# Patient Record
Sex: FEMALE | Race: ASIAN | NOT HISPANIC OR LATINO | Employment: FULL TIME | URBAN - METROPOLITAN AREA
[De-identification: names, ages, dates, MRNs, and addresses within clinical notes are randomized per-mention and may not be internally consistent; named-entity substitution may affect disease eponyms.]

---

## 2017-12-21 ENCOUNTER — GENERIC CONVERSION - ENCOUNTER (OUTPATIENT)
Dept: OTHER | Facility: OTHER | Age: 32
End: 2017-12-21

## 2017-12-26 ENCOUNTER — ALLSCRIPTS OFFICE VISIT (OUTPATIENT)
Dept: OTHER | Facility: OTHER | Age: 32
End: 2017-12-26

## 2017-12-26 ENCOUNTER — GENERIC CONVERSION - ENCOUNTER (OUTPATIENT)
Dept: OTHER | Facility: OTHER | Age: 32
End: 2017-12-26

## 2017-12-26 DIAGNOSIS — Z34.81 ENCOUNTER FOR SUPERVISION OF OTHER NORMAL PREGNANCY, FIRST TRIMESTER: ICD-10-CM

## 2017-12-30 ENCOUNTER — TRANSCRIBE ORDERS (OUTPATIENT)
Dept: LAB | Facility: HOSPITAL | Age: 32
End: 2017-12-30

## 2017-12-30 ENCOUNTER — APPOINTMENT (OUTPATIENT)
Dept: LAB | Facility: HOSPITAL | Age: 32
End: 2017-12-30
Attending: OBSTETRICS & GYNECOLOGY
Payer: COMMERCIAL

## 2017-12-30 DIAGNOSIS — Z34.81 ENCOUNTER FOR SUPERVISION OF OTHER NORMAL PREGNANCY, FIRST TRIMESTER: ICD-10-CM

## 2017-12-30 LAB
ABO GROUP BLD: NORMAL
BASOPHILS # BLD AUTO: 0.01 THOUSANDS/ΜL (ref 0–0.1)
BASOPHILS NFR BLD AUTO: 0 % (ref 0–1)
BILIRUB UR QL STRIP: NEGATIVE
BLD GP AB SCN SERPL QL: NEGATIVE
CLARITY UR: CLEAR
COLOR UR: YELLOW
EOSINOPHIL # BLD AUTO: 0.05 THOUSAND/ΜL (ref 0–0.61)
EOSINOPHIL NFR BLD AUTO: 1 % (ref 0–6)
ERYTHROCYTE [DISTWIDTH] IN BLOOD BY AUTOMATED COUNT: 13 % (ref 11.6–15.1)
GLUCOSE UR STRIP-MCNC: NEGATIVE MG/DL
HBV SURFACE AG SER QL: NORMAL
HCT VFR BLD AUTO: 36.6 % (ref 34.8–46.1)
HGB BLD-MCNC: 12.4 G/DL (ref 11.5–15.4)
HGB UR QL STRIP.AUTO: NEGATIVE
KETONES UR STRIP-MCNC: NEGATIVE MG/DL
LEUKOCYTE ESTERASE UR QL STRIP: NEGATIVE
LYMPHOCYTES # BLD AUTO: 1.69 THOUSANDS/ΜL (ref 0.6–4.47)
LYMPHOCYTES NFR BLD AUTO: 23 % (ref 14–44)
MCH RBC QN AUTO: 29.5 PG (ref 26.8–34.3)
MCHC RBC AUTO-ENTMCNC: 33.9 G/DL (ref 31.4–37.4)
MCV RBC AUTO: 87 FL (ref 82–98)
MONOCYTES # BLD AUTO: 0.51 THOUSAND/ΜL (ref 0.17–1.22)
MONOCYTES NFR BLD AUTO: 7 % (ref 4–12)
NEUTROPHILS # BLD AUTO: 5.01 THOUSANDS/ΜL (ref 1.85–7.62)
NEUTS SEG NFR BLD AUTO: 69 % (ref 43–75)
NITRITE UR QL STRIP: NEGATIVE
NRBC BLD AUTO-RTO: 0 /100 WBCS
PH UR STRIP.AUTO: 7.5 [PH] (ref 4.5–8)
PLATELET # BLD AUTO: 253 THOUSANDS/UL (ref 149–390)
PMV BLD AUTO: 8.9 FL (ref 8.9–12.7)
PROT UR STRIP-MCNC: NEGATIVE MG/DL
RBC # BLD AUTO: 4.2 MILLION/UL (ref 3.81–5.12)
RH BLD: POSITIVE
SP GR UR STRIP.AUTO: 1.01 (ref 1–1.03)
SPECIMEN EXPIRATION DATE: NORMAL
UROBILINOGEN UR QL STRIP.AUTO: 0.2 E.U./DL
WBC # BLD AUTO: 7.29 THOUSAND/UL (ref 4.31–10.16)

## 2017-12-30 PROCEDURE — 86900 BLOOD TYPING SEROLOGIC ABO: CPT

## 2017-12-30 PROCEDURE — 87340 HEPATITIS B SURFACE AG IA: CPT

## 2017-12-30 PROCEDURE — 36415 COLL VENOUS BLD VENIPUNCTURE: CPT

## 2017-12-30 PROCEDURE — 86592 SYPHILIS TEST NON-TREP QUAL: CPT

## 2017-12-30 PROCEDURE — 85025 COMPLETE CBC W/AUTO DIFF WBC: CPT

## 2017-12-30 PROCEDURE — 87086 URINE CULTURE/COLONY COUNT: CPT

## 2017-12-30 PROCEDURE — 81003 URINALYSIS AUTO W/O SCOPE: CPT

## 2017-12-30 PROCEDURE — 87389 HIV-1 AG W/HIV-1&-2 AB AG IA: CPT

## 2017-12-30 PROCEDURE — 86850 RBC ANTIBODY SCREEN: CPT

## 2017-12-30 PROCEDURE — 86901 BLOOD TYPING SEROLOGIC RH(D): CPT

## 2017-12-31 LAB — BACTERIA UR CULT: NORMAL

## 2018-01-01 LAB
HIV 1+2 AB+HIV1 P24 AG SERPL QL IA: NORMAL
RPR SER QL: NORMAL

## 2018-01-10 ENCOUNTER — GENERIC CONVERSION - ENCOUNTER (OUTPATIENT)
Dept: OTHER | Facility: OTHER | Age: 33
End: 2018-01-10

## 2018-01-10 ENCOUNTER — ALLSCRIPTS OFFICE VISIT (OUTPATIENT)
Dept: PERINATAL CARE | Facility: CLINIC | Age: 33
End: 2018-01-10
Payer: COMMERCIAL

## 2018-01-10 PROCEDURE — 76813 OB US NUCHAL MEAS 1 GEST: CPT | Performed by: OBSTETRICS & GYNECOLOGY

## 2018-01-10 PROCEDURE — 76801 OB US < 14 WKS SINGLE FETUS: CPT | Performed by: OBSTETRICS & GYNECOLOGY

## 2018-01-15 ENCOUNTER — GENERIC CONVERSION - ENCOUNTER (OUTPATIENT)
Dept: OTHER | Facility: OTHER | Age: 33
End: 2018-01-15

## 2018-01-15 ENCOUNTER — LAB REQUISITION (OUTPATIENT)
Dept: LAB | Facility: HOSPITAL | Age: 33
End: 2018-01-15
Payer: COMMERCIAL

## 2018-01-15 ENCOUNTER — ALLSCRIPTS OFFICE VISIT (OUTPATIENT)
Dept: OTHER | Facility: OTHER | Age: 33
End: 2018-01-15

## 2018-01-15 DIAGNOSIS — Z34.81 ENCOUNTER FOR SUPERVISION OF OTHER NORMAL PREGNANCY, FIRST TRIMESTER: ICD-10-CM

## 2018-01-15 PROCEDURE — 87591 N.GONORRHOEAE DNA AMP PROB: CPT | Performed by: OBSTETRICS & GYNECOLOGY

## 2018-01-15 PROCEDURE — G0145 SCR C/V CYTO,THINLAYER,RESCR: HCPCS | Performed by: OBSTETRICS & GYNECOLOGY

## 2018-01-15 PROCEDURE — 87491 CHLMYD TRACH DNA AMP PROBE: CPT | Performed by: OBSTETRICS & GYNECOLOGY

## 2018-01-15 PROCEDURE — 87624 HPV HI-RISK TYP POOLED RSLT: CPT | Performed by: OBSTETRICS & GYNECOLOGY

## 2018-01-18 LAB
CHLAMYDIA DNA CVX QL NAA+PROBE: NORMAL
N GONORRHOEA DNA GENITAL QL NAA+PROBE: NORMAL

## 2018-01-22 LAB — HPV RRNA GENITAL QL NAA+PROBE: NORMAL

## 2018-01-23 LAB
LAB AP GYN PRIMARY INTERPRETATION: NORMAL
LAB AP LMP: NORMAL
Lab: NORMAL

## 2018-01-23 NOTE — PROGRESS NOTES
NOLAN 10 2018         RE: Monika Fuentes                               To: [facility]  MR#: [id number]                                    [facility]  : [address] [facility],[address]: [id number]                             Aura Leach U  6    (Exam #: [id number])                           Fax: [phone]      The LMP of this 34year old,  G3, P2-0-0-2 patient was OCT 12 2017, giving   her an ROCIO of 2018 and a current gestational age of 16 weeks 2 days   by dates  A sonographic examination was performed on NOLAN 10 2018 using   real time equipment  The ultrasound examination was performed using   abdominal technique  The patient has a BMI of 24 9  Her blood pressure   today was 117/59  Earliest US on record:    17  8w5d  18    ROCIO Multiple   longitudinal and transverse sections revealed a swift intrauterine   pregnancy with the fetus in variable presentation  The placenta is   anterior in implantation, grade 0 in appearance  Cardiac motion was observed at 163 bpm       INDICATIONS      first trimester genetic screening      Exam Types      Level I      RESULTS      Fetus # 1 of 1   Variable presentation      MEASUREMENTS (* Included In Average GA)      CRL              5 5 cm        11 weeks 6 days*   Nuchal Trans    1 10 mm      THE AVERAGE GESTATIONAL AGE is 11 weeks 6 days +/- 7 days  ANATOMY COMMENTS      Anatomic detail is limited at this gestational age  The fetal cranium   appeared normal in shape and the nuchal translucency was normal in size   (1 1mm)  The nasal bone appears to be present  The intracranial anatomy   was unremarkable  Anatomy of the fetal thorax appeared within normal   limits  The cardiac rhythm was regular and documented with M-mode  Within   the abdomen, the stomach & bladder were visualized and the abdominal wall   appeared intact  A three vessel cord appears to be present  Active   movement of the fetal body & extremities was seen  There is no suspicion   of a subchorionic bleed  The placental cord insertion appeared normal      There is no suspicion of a uterine myoma  Free fluid is not seen in the   posterior cul-de-sac  ADNEXA      The left ovary appeared normal and measured 2 0 x 2 1 x 1 4 cm with a   volume of 3 2 cc  The right ovary appeared normal and measured 1 9 x 1 9 x   1 0 cm with a volume of 1 9 cc       AMNIOTIC FLUID         Largest Vertical Pocket = 3 9 cm   Amniotic Fluid: Normal      IMPRESSION      Ryan IUP   11 weeks and 6 days by this ultrasound  (ROCIO=JUL 26 2018)   Variable presentation   Regular fetal heart rate of 163 bpm   Anterior placenta      GENERAL COMMENT      Ms Monty Mabry is here for nuchal translucency  There is a single live intrauterine pregnancy with size equivalent to   dates  No gross anomalies were identified on limited views  Amniotic fluid is within normal limits  Nuchal translucency measures 1 1mm which is within normal limits for this   crown-rump length  Evaluation and Management:   The patient was counseled regarding the above findings  A total of 10   minutes were spent in this encounter with >50% of the time spent in   face-to-face counseling and in coordination of care  The limitations of ultrasound and aneuploidy screening were explained to   her  Genetic screening and diagnostic testing options were discussed with   her  Blood work was drawn today for Sequential Screen  She should return in 7-8 weeks for anatomy survey and cervical length   screening  At the conclusion of today's encounter, all questions were answered to her   satisfaction  Thank you very much for this kind referral and please do   not hesitate to contact me with any further questions or concerns  RALPH Apodaca M D     Maternal Fetal Medicine   Electronically signed 01/10/18 09:51

## 2018-01-24 ENCOUNTER — TELEPHONE (OUTPATIENT)
Dept: OBGYN CLINIC | Facility: CLINIC | Age: 33
End: 2018-01-24

## 2018-01-24 VITALS
WEIGHT: 120.13 LBS | DIASTOLIC BLOOD PRESSURE: 66 MMHG | HEIGHT: 58 IN | BODY MASS INDEX: 25.22 KG/M2 | SYSTOLIC BLOOD PRESSURE: 122 MMHG

## 2018-01-24 VITALS — SYSTOLIC BLOOD PRESSURE: 110 MMHG | DIASTOLIC BLOOD PRESSURE: 58 MMHG | BODY MASS INDEX: 24.24 KG/M2 | WEIGHT: 118 LBS

## 2018-01-24 VITALS
HEART RATE: 88 BPM | WEIGHT: 119 LBS | HEIGHT: 58 IN | BODY MASS INDEX: 24.98 KG/M2 | SYSTOLIC BLOOD PRESSURE: 117 MMHG | DIASTOLIC BLOOD PRESSURE: 59 MMHG

## 2018-01-24 NOTE — TELEPHONE ENCOUNTER
Returned pts' phone call _ L/M on voicemail for pt [facility]

## 2018-01-24 NOTE — TELEPHONE ENCOUNTER
Pt called - 14 wks - last night she had a cramp/pain - went away & felt better this morning as well as feeling well this evening - not spotting - but has a slight discharge - please advise

## 2018-01-24 NOTE — TELEPHONE ENCOUNTER
Patient is 14 weeks gestation  States menstrual like cramping last night  Continues to have discharge which she states she mentioned at last office visit  No vaginal bleeding  Denies any irritation or itching  No history of pre term labor or risk factors  Advised hydration  May take tylenol  Reassured cramping can be expected  Report any worsening symptoms  Router to provider to further advise

## 2018-02-12 ENCOUNTER — TRANSCRIBE ORDERS (OUTPATIENT)
Dept: LAB | Facility: HOSPITAL | Age: 33
End: 2018-02-12

## 2018-02-12 ENCOUNTER — APPOINTMENT (OUTPATIENT)
Dept: LAB | Facility: HOSPITAL | Age: 33
End: 2018-02-12
Attending: OBSTETRICS & GYNECOLOGY
Payer: COMMERCIAL

## 2018-02-12 DIAGNOSIS — Z34.81 PRENATAL CARE, SUBSEQUENT PREGNANCY, FIRST TRIMESTER: ICD-10-CM

## 2018-02-12 DIAGNOSIS — Z34.81 PRENATAL CARE, SUBSEQUENT PREGNANCY, FIRST TRIMESTER: Primary | ICD-10-CM

## 2018-02-12 DIAGNOSIS — Z34.81 ENCOUNTER FOR SUPERVISION OF OTHER NORMAL PREGNANCY, FIRST TRIMESTER: ICD-10-CM

## 2018-02-12 LAB — RUBV IGG SERPL IA-ACNC: 44.9 IU/ML

## 2018-02-12 PROCEDURE — 86762 RUBELLA ANTIBODY: CPT

## 2018-02-12 PROCEDURE — 36415 COLL VENOUS BLD VENIPUNCTURE: CPT

## 2018-02-13 LAB — SCAN RESULT: NORMAL

## 2018-02-15 ENCOUNTER — ROUTINE PRENATAL (OUTPATIENT)
Dept: OBGYN CLINIC | Facility: CLINIC | Age: 33
End: 2018-02-15

## 2018-02-15 VITALS — WEIGHT: 123 LBS | BODY MASS INDEX: 25.71 KG/M2 | DIASTOLIC BLOOD PRESSURE: 64 MMHG | SYSTOLIC BLOOD PRESSURE: 102 MMHG

## 2018-02-15 DIAGNOSIS — Z34.82 MULTIGRAVIDA IN SECOND TRIMESTER: Primary | ICD-10-CM

## 2018-02-15 PROBLEM — L30.9 ECZEMA: Status: ACTIVE | Noted: 2018-02-15

## 2018-02-15 PROCEDURE — PNV: Performed by: PHYSICIAN ASSISTANT

## 2018-02-15 NOTE — PROGRESS NOTES
Problem List Items Addressed This Visit     Multigravida in second trimester - Primary     Feels well  No FM yet  Received flu shot  First OB labs normal, A+ blood type  Pap, HPV, cultures neg  Did Sequential screen  Has level II scheduled  Plans to breastfeed - breast pump slip given  Has two boys at home, she is hoping for a girl, he is hoping for another boy

## 2018-02-15 NOTE — ASSESSMENT & PLAN NOTE
Feels well  No FM yet  Received flu shot  First OB labs normal, A+ blood type  Pap, HPV, cultures neg  Did Sequential screen  Has level II scheduled  Plans to breastfeed - breast pump slip given  Has two boys at home, she is hoping for a girl, he is hoping for another boy

## 2018-03-01 ENCOUNTER — TELEPHONE (OUTPATIENT)
Dept: PERINATAL CARE | Facility: CLINIC | Age: 33
End: 2018-03-01

## 2018-03-01 ENCOUNTER — OFFICE VISIT (OUTPATIENT)
Dept: URGENT CARE | Facility: MEDICAL CENTER | Age: 33
End: 2018-03-01
Payer: COMMERCIAL

## 2018-03-01 VITALS
OXYGEN SATURATION: 100 % | WEIGHT: 122 LBS | HEIGHT: 58 IN | SYSTOLIC BLOOD PRESSURE: 112 MMHG | HEART RATE: 78 BPM | RESPIRATION RATE: 14 BRPM | DIASTOLIC BLOOD PRESSURE: 70 MMHG | BODY MASS INDEX: 25.61 KG/M2 | TEMPERATURE: 98.3 F

## 2018-03-01 DIAGNOSIS — H10.32 ACUTE BACTERIAL CONJUNCTIVITIS OF LEFT EYE: Primary | ICD-10-CM

## 2018-03-01 PROCEDURE — 99202 OFFICE O/P NEW SF 15 MIN: CPT | Performed by: FAMILY MEDICINE

## 2018-03-01 RX ORDER — TOBRAMYCIN 3 MG/ML
1 SOLUTION/ DROPS OPHTHALMIC
Qty: 3 ML | Refills: 0 | Status: SHIPPED | OUTPATIENT
Start: 2018-03-01 | End: 2018-03-11 | Stop reason: SDUPTHER

## 2018-03-01 NOTE — PATIENT INSTRUCTIONS
Symptoms are likely consistent with left-sided bacterial conjunctivitis and advised to use given  Antibiotic eyedrops as per label instructions  Keep a close eye on the symptoms and if symptoms are not improving or worse especially worsening eye discharge or pain, seek immediate medical attention

## 2018-03-01 NOTE — PROGRESS NOTES
Assessment/Plan:    No problem-specific Assessment & Plan notes found for this encounter  Diagnoses and all orders for this visit:    Acute bacterial conjunctivitis of left eye  -     tobramycin (TOBREX) 0 3 % SOLN; Administer 1 drop into the left eye every 4 (four) hours while awake          Subjective:      Patient ID: Placido Rossi is a 34 y o  female  33 y/o Marceline female presents with c/o L eye redness and drainage x 1 day  Pt wears contacts but didn't have contacts in when sx started  Pt c/o watery discharge from eye and iritation denies crusting shut or blurry vision or eye pain  Pt's son was treated for the same thing, was diagnosed 2 weeks ago and given ciprofloxacin drops  Pt denies history of seasonal allergies  Conjunctivitis    Associated symptoms include congestion and rhinorrhea  Pertinent negatives include no fever  The following portions of the patient's history were reviewed and updated as appropriate:   She  has a past medical history of 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; Eczema; and Latent tuberculosis  She   Patient Active Problem List    Diagnosis Date Noted    Eczema 02/15/2018    Multigravida in second trimester 02/15/2018     Current Outpatient Prescriptions   Medication Sig Dispense Refill    Prenat w/o S-DY-Csjfdqo-FA-DHA (PRENATE DHA) 28-0 6-0 4-300 MG CAPS Take by mouth      tobramycin (TOBREX) 0 3 % SOLN Administer 1 drop into the left eye every 4 (four) hours while awake 3 mL 0     No current facility-administered medications for this visit  Current Outpatient Prescriptions on File Prior to Visit   Medication Sig    Prenat w/o C-TY-Ettgcuv-FA-DHA (PRENATE DHA) 28-0 6-0 4-300 MG CAPS Take by mouth     No current facility-administered medications on file prior to visit  She has No Known Allergies       Review of Systems   Constitutional: Negative for fever  HENT: Positive for congestion and rhinorrhea  All other systems reviewed and are negative  Objective:      /70   Pulse 78   Temp 98 3 °F (36 8 °C)   Resp 14   Ht 4' 10" (1 473 m)   Wt 55 3 kg (122 lb)   LMP 10/16/2017   SpO2 100%   BMI 25 50 kg/m²          Physical Exam   Constitutional: She is oriented to person, place, and time  She appears well-developed and well-nourished  HENT:   Head: Normocephalic and atraumatic  Right Ear: Tympanic membrane and external ear normal  Tympanic membrane is not erythematous and not bulging  Left Ear: Tympanic membrane and external ear normal  Tympanic membrane is not erythematous and not bulging  Nose: Right sinus exhibits no maxillary sinus tenderness and no frontal sinus tenderness  Left sinus exhibits no maxillary sinus tenderness and no frontal sinus tenderness  Mouth/Throat: Oropharynx is clear and moist and mucous membranes are normal  No oropharyngeal exudate  Eyes: Pupils are equal, round, and reactive to light  Right eye exhibits no discharge and no hordeolum  No foreign body present in the right eye  Left eye exhibits discharge  Left eye exhibits no hordeolum  No foreign body present in the left eye  Right conjunctiva is not injected  Right conjunctiva has no hemorrhage  Left conjunctiva is injected  Left conjunctiva has no hemorrhage  No scleral icterus  Right eye exhibits normal extraocular motion and no nystagmus  Left eye exhibits normal extraocular motion and no nystagmus  Cardiovascular: Normal rate, regular rhythm, S1 normal and S2 normal   Exam reveals no gallop, no distant heart sounds and no friction rub  No murmur heard  Pulmonary/Chest: Effort normal and breath sounds normal  No respiratory distress  She has no wheezes  She has no rales  Abdominal: Soft  She exhibits no distension and no mass  There is no tenderness  There is no rebound, no guarding and no CVA tenderness  Neurological: She is alert and oriented to person, place, and time  Skin: Skin is warm and dry  No erythema     Psychiatric: She has a normal mood and affect  Patient was seen and examined by me independently and along with PA student  I agree with history, review of system and examination except for following modifications  26-year-old [place] female with complains of left eye discharge and some redness for 1 day without any pain and without any vision changes  She noticed watery discharge along with some crusting  Her son was diagnosed with same thing but 2 weeks ago  Vital signs stable and no acute pain or distress  On examination of the left eye there is moderate erythema of palpebral conjunctiva without any significant eyelid swelling of upper or lower eyelid  She mild injection of scleral noticed  Normal pupillary reflex bilaterally  No tenderness to palpation of eyeball    No lymphadenitis noticed in anterior or posterior chains

## 2018-03-07 NOTE — PROGRESS NOTES
Education  Baby & Me Education 1st Trimester:   First Trimester Education provided:   benefits of breastfeeding, importance of exclusive breastfeeding, early initiation of breastfeeding, exclusive breastfeeding for the first 6 months, Pregnancy Essentials Reference Guide given and Other education given: Sarah Troy The patient is planning on breastfeeding        Signatures   Electronically signed by : Vickie Rush, ; Dec 26 2017  2:44PM EST                       (Author)

## 2018-03-08 ENCOUNTER — OFFICE VISIT (OUTPATIENT)
Dept: FAMILY MEDICINE CLINIC | Facility: CLINIC | Age: 33
End: 2018-03-08
Payer: COMMERCIAL

## 2018-03-08 VITALS
SYSTOLIC BLOOD PRESSURE: 120 MMHG | DIASTOLIC BLOOD PRESSURE: 80 MMHG | BODY MASS INDEX: 26.36 KG/M2 | HEIGHT: 58 IN | TEMPERATURE: 99.3 F | HEART RATE: 80 BPM | RESPIRATION RATE: 16 BRPM | WEIGHT: 125.6 LBS

## 2018-03-08 DIAGNOSIS — B34.9 ACUTE VIRAL SYNDROME: Primary | ICD-10-CM

## 2018-03-08 DIAGNOSIS — Z34.82 MULTIGRAVIDA IN SECOND TRIMESTER: ICD-10-CM

## 2018-03-08 PROCEDURE — 99213 OFFICE O/P EST LOW 20 MIN: CPT | Performed by: FAMILY MEDICINE

## 2018-03-08 NOTE — PROGRESS NOTES
Assessment/Plan:   1  Viral syndrome: Supportive care  Return parameters d/w pt   2  Pregnancy, incidental to diagnosis: PTL precautions  F/U with OB provider as scheduled  Subjective:     Patient ID: Violet Albright is a 34 y o  female  33 yo female,  with IUP @ 28 weeks, who presents with one day history of nausea, and vomiting (non-bloody, non-bilious)  + ill contacts  Drinking OK, decreased appetite  No rash  Had URI earlier this week  No diarrhea  No new foods or medications  No rash  + GFM, no contractions, no bleeding, no LOF  Current Outpatient Prescriptions:     Prenat w/o V-AB-Htbnaqb-FA-DHA (PRENATE DHA) 28-0 6-0 4-300 MG CAPS, Take by mouth, Disp: , Rfl:     tobramycin (TOBREX) 0 3 % SOLN, Administer 1 drop into the left eye every 4 (four) hours while awake, Disp: 3 mL, Rfl: 0      Review of Systems   Constitutional: Positive for fatigue  Negative for fever  HENT: Positive for congestion, hearing loss, postnasal drip and rhinorrhea  Negative for ear discharge, ear pain, sinus pain and sinus pressure  Eyes: Negative for discharge  Respiratory: Positive for cough  Negative for chest tightness, shortness of breath and wheezing  Cardiovascular: Negative for chest pain, palpitations and leg swelling  Gastrointestinal: Positive for nausea and vomiting  Negative for constipation and diarrhea  Skin: Negative for rash  Objective:    Vitals:    18 0954   BP: 120/80   Pulse: 80   Resp: 16   Temp: 99 3 °F (37 4 °C)          Physical Exam   Constitutional: She appears well-developed and well-nourished  HENT:   Head: Normocephalic  Right Ear: External ear normal    Left Ear: External ear normal    + coryza  + mild erythema of oropharynx, MMM   Neck: Normal range of motion  Neck supple  Cardiovascular: Normal rate, regular rhythm, normal heart sounds and intact distal pulses  Pulmonary/Chest: Effort normal and breath sounds normal    Abdominal: Soft  Bowel sounds are normal    Gravid, FHT's 155   Skin: No rash noted  Cap refill <3 seconds     Nursing note and vitals reviewed

## 2018-03-08 NOTE — LETTER
March 8, 2018     Patient: Zuleika Fernandez   YOB: 1985   Date of Visit: 3/8/2018       To Whom it May Concern:    Rommel Duran is under my professional care  She was seen in my office on 3/8/2018  She may return to work on 3/11/2018  If you have any questions or concerns, please don't hesitate to call           Sincerely,          Nitin Almanza MD        CC: No Recipients

## 2018-03-11 ENCOUNTER — OFFICE VISIT (OUTPATIENT)
Dept: URGENT CARE | Age: 33
End: 2018-03-11
Payer: COMMERCIAL

## 2018-03-11 VITALS
DIASTOLIC BLOOD PRESSURE: 66 MMHG | TEMPERATURE: 99.2 F | HEIGHT: 58 IN | HEART RATE: 88 BPM | RESPIRATION RATE: 16 BRPM | SYSTOLIC BLOOD PRESSURE: 112 MMHG | WEIGHT: 125 LBS | OXYGEN SATURATION: 98 % | BODY MASS INDEX: 26.24 KG/M2

## 2018-03-11 DIAGNOSIS — J01.90 ACUTE SINUSITIS, RECURRENCE NOT SPECIFIED, UNSPECIFIED LOCATION: Primary | ICD-10-CM

## 2018-03-11 DIAGNOSIS — H10.32 ACUTE BACTERIAL CONJUNCTIVITIS OF LEFT EYE: ICD-10-CM

## 2018-03-11 PROCEDURE — 99213 OFFICE O/P EST LOW 20 MIN: CPT | Performed by: FAMILY MEDICINE

## 2018-03-11 RX ORDER — TOBRAMYCIN 3 MG/ML
1 SOLUTION/ DROPS OPHTHALMIC
Qty: 3 ML | Refills: 0 | Status: SHIPPED | OUTPATIENT
Start: 2018-03-11 | End: 2018-04-11 | Stop reason: ALTCHOICE

## 2018-03-11 RX ORDER — AMOXICILLIN 500 MG/1
500 CAPSULE ORAL EVERY 8 HOURS SCHEDULED
Qty: 30 CAPSULE | Refills: 0 | Status: SHIPPED | OUTPATIENT
Start: 2018-03-11 | End: 2018-03-21

## 2018-03-11 NOTE — PATIENT INSTRUCTIONS
Rest and drink adequate fluids  Start antibiotic  Call OB tomorrow to discuss use of probiotics  Start eye drop, avoid touching eye  Avoid rubbing eye and use clean cloth each time to clean eye  Nasal saline flushes  Continue to monitor fetal movements  GO to ER with decreased fetal movements or contractions  Follow up with PCP if no improvement

## 2018-03-11 NOTE — PROGRESS NOTES
[address]        NAME: Zuleika Fernandez is a 34 y o  female  : 1985    MRN: [id number]  DATE: 2018  TIME: 5:37 PM    Assessment and Plan   Acute sinusitis, recurrence not specified, unspecified location [J01 90]  1  Acute sinusitis, recurrence not specified, unspecified location  amoxicillin (AMOXIL) 500 mg capsule   2  Acute bacterial conjunctivitis of left eye  tobramycin (TOBREX) 0 3 % SOLN         Patient Instructions     Patient Instructions   Rest and drink adequate fluids  Start antibiotic  Call OB tomorrow to discuss use of probiotics  Start eye drop, avoid touching eye  Avoid rubbing eye and use clean cloth each time to clean eye  Nasal saline flushes  Continue to monitor fetal movements  GO to ER with decreased fetal movements or contractions  Follow up with PCP if no improvement  Chief Complaint     Chief Complaint   Patient presents with    Conjunctivitis         History of Present Illness   Zuleika Fernandez presents to the clinic c/o    This is a 34year old female here today with complaints of red left eye  She states on  she was treated for conjunctivitis of the left eye  The following day it moved to right eye  She continue eye drops and symptoms resolved  On  she had gastroenteritis with nausea and vomiting  She was seen by her PCP at the time  Symptoms resolved  She has slight upset stomach at this time  She continues to have sinus pressure and nasal congestion  She has had congestion since Monday  She denies fevers but feel run down  She states nasal discharge is green/ yellow in color  She has been drinking fluids, no contractions, good fetal movements  Review of Systems   Review of Systems   Constitutional: Positive for activity change and fatigue  Negative for chills and fever  HENT: Positive for congestion, sinus pain and sinus pressure  Respiratory: Negative  Cardiovascular: Negative      Genitourinary: Negative  Musculoskeletal: Negative  Current Medications     Long-Term Prescriptions   Medication Sig Dispense Refill    Prenat w/o W-JR-Iqyyztj-FA-DHA (PRENATE DHA) 28-0 6-0 4-300 MG CAPS Take by mouth         Current Allergies     Allergies as of 03/11/2018    (No Known Allergies)            The following portions of the patient's history were reviewed and updated as appropriate: allergies, current medications, past family history, past medical history, past social history, past surgical history and problem list     Objective   /66 (BP Location: Left arm, Patient Position: Sitting, Cuff Size: Standard)   Pulse 88   Temp 99 2 °F (37 3 °C) (Temporal)   Resp 16   Ht 4' 10" (1 473 m)   Wt 56 7 kg (125 lb)   LMP 10/16/2017   SpO2 98%   BMI 26 13 kg/m²        Physical Exam     Physical Exam   Constitutional: She appears well-developed and well-nourished  HENT:   Head: Normocephalic  Right Ear: External ear normal    Left Ear: External ear normal    TTP over the maxillary sinuses  Eyes:   Left eye: injected, no crusting  Neck: Normal range of motion  Neck supple  Cardiovascular: Normal rate, regular rhythm and normal heart sounds  Pulmonary/Chest: Effort normal and breath sounds normal    Skin: Skin is warm and dry  Psychiatric: She has a normal mood and affect   Her behavior is normal

## 2018-03-11 NOTE — LETTER
March 11, 2018     Patient: Jennifer Kerns   YOB: 1985   Date of Visit: 3/11/2018       To Whom It May Concern: It is my medical opinion that Lela Chowdary may return to work on 03/13/2018  If you have any questions or concerns, please don't hesitate to call           Sincerely,        [facility]    CC: No Recipients

## 2018-03-11 NOTE — PROGRESS NOTES
Started with Left pink eye 3/1/18  Next day right eye started  Couple days later started with cold S/S  3/8 started with a GI bug  Still has cold S/S and today started with redness, discharge and tearing in left eye again  Has been using Tobramycin drops

## 2018-03-14 ENCOUNTER — ROUTINE PRENATAL (OUTPATIENT)
Dept: OBGYN CLINIC | Facility: CLINIC | Age: 33
End: 2018-03-14

## 2018-03-14 VITALS — DIASTOLIC BLOOD PRESSURE: 56 MMHG | BODY MASS INDEX: 25.92 KG/M2 | WEIGHT: 124 LBS | SYSTOLIC BLOOD PRESSURE: 98 MMHG

## 2018-03-14 DIAGNOSIS — Z34.82 ENCOUNTER FOR SUPERVISION OF NORMAL PREGNANCY IN MULTIGRAVIDA IN SECOND TRIMESTER: Primary | ICD-10-CM

## 2018-03-14 PROCEDURE — PNV: Performed by: PHYSICIAN ASSISTANT

## 2018-03-22 ENCOUNTER — ROUTINE PRENATAL (OUTPATIENT)
Dept: PERINATAL CARE | Facility: CLINIC | Age: 33
End: 2018-03-22
Payer: COMMERCIAL

## 2018-03-22 VITALS
BODY MASS INDEX: 26.39 KG/M2 | HEART RATE: 101 BPM | SYSTOLIC BLOOD PRESSURE: 124 MMHG | DIASTOLIC BLOOD PRESSURE: 74 MMHG | WEIGHT: 125.7 LBS | HEIGHT: 58 IN

## 2018-03-22 DIAGNOSIS — Z3A.22 22 WEEKS GESTATION OF PREGNANCY: ICD-10-CM

## 2018-03-22 DIAGNOSIS — O28.1 HIGH RISK PREGNANCY WITH HIGH HCG: ICD-10-CM

## 2018-03-22 DIAGNOSIS — Z36.86 ENCOUNTER FOR ANTENATAL SCREENING FOR CERVICAL LENGTH: ICD-10-CM

## 2018-03-22 DIAGNOSIS — O09.90 HIGH RISK PREGNANCY WITH HIGH HCG: ICD-10-CM

## 2018-03-22 DIAGNOSIS — Z36.3 ENCOUNTER FOR ANTENATAL SCREENING FOR MALFORMATIONS: Primary | ICD-10-CM

## 2018-03-22 PROCEDURE — 99212 OFFICE O/P EST SF 10 MIN: CPT | Performed by: OBSTETRICS & GYNECOLOGY

## 2018-03-22 PROCEDURE — 76805 OB US >/= 14 WKS SNGL FETUS: CPT | Performed by: OBSTETRICS & GYNECOLOGY

## 2018-03-22 PROCEDURE — 76817 TRANSVAGINAL US OBSTETRIC: CPT | Performed by: OBSTETRICS & GYNECOLOGY

## 2018-03-22 NOTE — PROGRESS NOTES
Please refer to the [facility] ultrasound report in Ob Procedures for additional information regarding the visit to the [facility]  today

## 2018-03-22 NOTE — LETTER
March 22, 2018     Ric Rangel MD  [address] [place] [id number]    Patient: Tana Walker   YOB: 1985   Date of Visit: 3/22/2018       Dear Dr Mey Tsang: Thank you for referring Christ Romberg to me for evaluation  Below are my notes for this consultation  If you have questions, please do not hesitate to call me  I look forward to following your patient along with you  Sincerely,        Derrick Ferreira MD        CC: No Recipients  Derrick Ferreira MD  3/22/2018  8:35 AM  Sign at close encounter  Please refer to the [facility] ultrasound report in Ob Procedures for additional information regarding the visit to the [facility]  today

## 2018-04-02 ENCOUNTER — TELEPHONE (OUTPATIENT)
Dept: OBGYN CLINIC | Facility: CLINIC | Age: 33
End: 2018-04-02

## 2018-04-02 NOTE — TELEPHONE ENCOUNTER
Returned pts'; p c    - L/M informing pt it is ok to take claritin during pregnancy  Pt advised to call with any further questions/concerns

## 2018-04-11 ENCOUNTER — ROUTINE PRENATAL (OUTPATIENT)
Dept: OBGYN CLINIC | Facility: CLINIC | Age: 33
End: 2018-04-11

## 2018-04-11 VITALS — DIASTOLIC BLOOD PRESSURE: 60 MMHG | BODY MASS INDEX: 27.13 KG/M2 | SYSTOLIC BLOOD PRESSURE: 92 MMHG | WEIGHT: 129.8 LBS

## 2018-04-11 DIAGNOSIS — Z3A.25 25 WEEKS GESTATION OF PREGNANCY: ICD-10-CM

## 2018-04-11 DIAGNOSIS — O28.1 HIGH RISK PREGNANCY WITH HIGH HCG: ICD-10-CM

## 2018-04-11 DIAGNOSIS — Z34.82 ENCOUNTER FOR SUPERVISION OF NORMAL PREGNANCY IN MULTIGRAVIDA IN SECOND TRIMESTER: Primary | ICD-10-CM

## 2018-04-11 DIAGNOSIS — O09.90 HIGH RISK PREGNANCY WITH HIGH HCG: ICD-10-CM

## 2018-04-11 PROBLEM — O28.0 ABNORMAL MSAFP (MATERNAL SERUM ALPHA-FETOPROTEIN), ELEVATED: Status: ACTIVE | Noted: 2018-04-11

## 2018-04-11 PROCEDURE — PNV: Performed by: OBSTETRICS & GYNECOLOGY

## 2018-04-11 NOTE — PROGRESS NOTES
Problem List Items Addressed This Visit        Other    Encounter for supervision of normal pregnancy in multigravida in second trimester - Primary     Patient doing well  It's a girl!  (Has two boys at home)  28wk lab slip given  High risk pregnancy with high hCG     Will have growth scan in 3rd trimester  Normal level II US              Other Visit Diagnoses     25 weeks gestation of pregnancy        Relevant Orders    CBC and differential    Glucose, 1H PG    RPR

## 2018-04-20 ENCOUNTER — APPOINTMENT (OUTPATIENT)
Dept: LAB | Facility: HOSPITAL | Age: 33
End: 2018-04-20
Attending: OBSTETRICS & GYNECOLOGY
Payer: COMMERCIAL

## 2018-04-20 ENCOUNTER — TELEPHONE (OUTPATIENT)
Dept: OBGYN CLINIC | Facility: CLINIC | Age: 33
End: 2018-04-20

## 2018-04-20 DIAGNOSIS — Z3A.25 25 WEEKS GESTATION OF PREGNANCY: ICD-10-CM

## 2018-04-20 LAB
BASOPHILS # BLD AUTO: 0.01 THOUSANDS/ΜL (ref 0–0.1)
BASOPHILS NFR BLD AUTO: 0 % (ref 0–1)
EOSINOPHIL # BLD AUTO: 0.07 THOUSAND/ΜL (ref 0–0.61)
EOSINOPHIL NFR BLD AUTO: 1 % (ref 0–6)
ERYTHROCYTE [DISTWIDTH] IN BLOOD BY AUTOMATED COUNT: 14 % (ref 11.6–15.1)
GLUCOSE 1H P 50 G GLC PO SERPL-MCNC: 122 MG/DL
HCT VFR BLD AUTO: 31.6 % (ref 34.8–46.1)
HGB BLD-MCNC: 10.7 G/DL (ref 11.5–15.4)
LYMPHOCYTES # BLD AUTO: 1.19 THOUSANDS/ΜL (ref 0.6–4.47)
LYMPHOCYTES NFR BLD AUTO: 13 % (ref 14–44)
MCH RBC QN AUTO: 30.5 PG (ref 26.8–34.3)
MCHC RBC AUTO-ENTMCNC: 33.9 G/DL (ref 31.4–37.4)
MCV RBC AUTO: 90 FL (ref 82–98)
MONOCYTES # BLD AUTO: 0.48 THOUSAND/ΜL (ref 0.17–1.22)
MONOCYTES NFR BLD AUTO: 5 % (ref 4–12)
NEUTROPHILS # BLD AUTO: 7.46 THOUSANDS/ΜL (ref 1.85–7.62)
NEUTS SEG NFR BLD AUTO: 81 % (ref 43–75)
NRBC BLD AUTO-RTO: 0 /100 WBCS
PLATELET # BLD AUTO: 204 THOUSANDS/UL (ref 149–390)
PMV BLD AUTO: 9.5 FL (ref 8.9–12.7)
RBC # BLD AUTO: 3.51 MILLION/UL (ref 3.81–5.12)
WBC # BLD AUTO: 9.28 THOUSAND/UL (ref 4.31–10.16)

## 2018-04-20 PROCEDURE — 82950 GLUCOSE TEST: CPT

## 2018-04-20 PROCEDURE — 36415 COLL VENOUS BLD VENIPUNCTURE: CPT

## 2018-04-20 PROCEDURE — 85025 COMPLETE CBC W/AUTO DIFF WBC: CPT

## 2018-04-20 PROCEDURE — 86592 SYPHILIS TEST NON-TREP QUAL: CPT

## 2018-04-20 NOTE — TELEPHONE ENCOUNTER
----- Message from Whitley Baca MD sent at 4/20/2018  3:20 PM EDT -----  Can you please let Cornelio Marisol know that she passed her glucola, but her CBC was mildly anemic, should add iron daily

## 2018-04-23 LAB — RPR SER QL: NORMAL

## 2018-05-02 ENCOUNTER — ROUTINE PRENATAL (OUTPATIENT)
Dept: OBGYN CLINIC | Facility: CLINIC | Age: 33
End: 2018-05-02
Payer: COMMERCIAL

## 2018-05-02 VITALS — DIASTOLIC BLOOD PRESSURE: 62 MMHG | WEIGHT: 135 LBS | BODY MASS INDEX: 28.22 KG/M2 | SYSTOLIC BLOOD PRESSURE: 104 MMHG

## 2018-05-02 DIAGNOSIS — Z34.83 ENCOUNTER FOR SUPERVISION OF NORMAL PREGNANCY IN MULTIGRAVIDA IN THIRD TRIMESTER: ICD-10-CM

## 2018-05-02 DIAGNOSIS — Z3A.28 28 WEEKS GESTATION OF PREGNANCY: Primary | ICD-10-CM

## 2018-05-02 DIAGNOSIS — O28.1 HIGH RISK PREGNANCY WITH HIGH HCG: ICD-10-CM

## 2018-05-02 DIAGNOSIS — O09.90 HIGH RISK PREGNANCY WITH HIGH HCG: ICD-10-CM

## 2018-05-02 PROCEDURE — 90715 TDAP VACCINE 7 YRS/> IM: CPT

## 2018-05-02 PROCEDURE — 90471 IMMUNIZATION ADMIN: CPT

## 2018-05-02 PROCEDURE — PNV: Performed by: OBSTETRICS & GYNECOLOGY

## 2018-05-02 NOTE — ASSESSMENT & PLAN NOTE
Feels well  Mild constipation  28 week labs WNL  Hgb 10 7, to begin additional iron    Received tadp vacc today

## 2018-05-02 NOTE — PROGRESS NOTES
Problem List Items Addressed This Visit        Other    Encounter for supervision of normal pregnancy in multigravida in third trimester     Feels well  Mild constipation  28 week labs WNL  Hgb 10 7, to begin additional iron  Received tadp vacc today         High risk pregnancy with high hCG     Has PNC growth U/S in 2 weeks             Other Visit Diagnoses     28 weeks gestation of pregnancy    -  Primary    Relevant Orders    TDAP VACCINE GREATER THAN OR EQUAL TO 6YO IM (Completed)

## 2018-05-16 ENCOUNTER — ROUTINE PRENATAL (OUTPATIENT)
Dept: OBGYN CLINIC | Facility: CLINIC | Age: 33
End: 2018-05-16

## 2018-05-16 VITALS — DIASTOLIC BLOOD PRESSURE: 62 MMHG | BODY MASS INDEX: 29.18 KG/M2 | WEIGHT: 139.6 LBS | SYSTOLIC BLOOD PRESSURE: 110 MMHG

## 2018-05-16 DIAGNOSIS — O09.90 HIGH RISK PREGNANCY WITH HIGH HCG: ICD-10-CM

## 2018-05-16 DIAGNOSIS — O28.1 HIGH RISK PREGNANCY WITH HIGH HCG: ICD-10-CM

## 2018-05-16 DIAGNOSIS — Z34.83 ENCOUNTER FOR SUPERVISION OF NORMAL PREGNANCY IN MULTIGRAVIDA IN THIRD TRIMESTER: Primary | ICD-10-CM

## 2018-05-16 PROCEDURE — PNV: Performed by: OBSTETRICS & GYNECOLOGY

## 2018-05-16 NOTE — PROGRESS NOTES
Problem List Items Addressed This Visit        Other    Encounter for supervision of normal pregnancy in multigravida in third trimester - Primary     Feels well  No problems  Taking iron supplement         High risk pregnancy with high hCG     Has PNC F/U 5/17

## 2018-05-17 ENCOUNTER — ULTRASOUND (OUTPATIENT)
Dept: PERINATAL CARE | Facility: CLINIC | Age: 33
End: 2018-05-17
Payer: COMMERCIAL

## 2018-05-17 VITALS
HEIGHT: 58 IN | DIASTOLIC BLOOD PRESSURE: 64 MMHG | HEART RATE: 90 BPM | SYSTOLIC BLOOD PRESSURE: 104 MMHG | BODY MASS INDEX: 28.97 KG/M2 | WEIGHT: 138 LBS

## 2018-05-17 DIAGNOSIS — Z34.83 ENCOUNTER FOR SUPERVISION OF NORMAL PREGNANCY IN MULTIGRAVIDA IN THIRD TRIMESTER: ICD-10-CM

## 2018-05-17 DIAGNOSIS — O09.90 HIGH RISK PREGNANCY WITH HIGH HCG: Primary | ICD-10-CM

## 2018-05-17 DIAGNOSIS — O28.1 HIGH RISK PREGNANCY WITH HIGH HCG: Primary | ICD-10-CM

## 2018-05-17 DIAGNOSIS — Z3A.30 30 WEEKS GESTATION OF PREGNANCY: ICD-10-CM

## 2018-05-17 PROCEDURE — 99212 OFFICE O/P EST SF 10 MIN: CPT | Performed by: OBSTETRICS & GYNECOLOGY

## 2018-05-17 PROCEDURE — 76816 OB US FOLLOW-UP PER FETUS: CPT | Performed by: OBSTETRICS & GYNECOLOGY

## 2018-05-17 RX ORDER — FERROUS SULFATE 325(65) MG
325 TABLET ORAL
COMMUNITY
End: 2018-08-08 | Stop reason: ALTCHOICE

## 2018-05-17 NOTE — PROGRESS NOTES
Please refer to the [facility] ultrasound report in Ob Procedures for additional information regarding the visit to the [facility]  today

## 2018-05-17 NOTE — LETTER
May 17, 2018     Hans Joyner DO  [address]    Patient: Chalo Anderson   YOB: 1985   Date of Visit: 5/17/2018       Dear Dr Mitul De La Vega: Thank you for referring Florina Hdz to me for evaluation  Below are my notes for this consultation  If you have questions, please do not hesitate to call me  I look forward to following your patient along with you  Sincerely,        Dianna Hirsch MD        CC: No Recipients  Dianna Hirsch MD  5/17/2018  8:40 AM  Sign at close encounter  Please refer to the [facility] ultrasound report in Ob Procedures for additional information [facility]

## 2018-05-30 ENCOUNTER — ROUTINE PRENATAL (OUTPATIENT)
Dept: OBGYN CLINIC | Facility: CLINIC | Age: 33
End: 2018-05-30

## 2018-05-30 VITALS — DIASTOLIC BLOOD PRESSURE: 58 MMHG | SYSTOLIC BLOOD PRESSURE: 102 MMHG | WEIGHT: 140.8 LBS | BODY MASS INDEX: 29.43 KG/M2

## 2018-05-30 DIAGNOSIS — Z34.83 ENCOUNTER FOR SUPERVISION OF NORMAL PREGNANCY IN MULTIGRAVIDA IN THIRD TRIMESTER: Primary | ICD-10-CM

## 2018-05-30 PROCEDURE — PNV: Performed by: OBSTETRICS & GYNECOLOGY

## 2018-05-30 NOTE — PROGRESS NOTES
Problem List Items Addressed This Visit        Other    Encounter for supervision of normal pregnancy in multigravida in third trimester      Patient has no new complaints   normal fetal movements    Has 1 more ultrasound closer to her due date

## 2018-05-30 NOTE — ASSESSMENT & PLAN NOTE
Patient has no new complaints   normal fetal movements    Has 1 more ultrasound closer to her due date

## 2018-06-13 ENCOUNTER — ROUTINE PRENATAL (OUTPATIENT)
Dept: OBGYN CLINIC | Facility: CLINIC | Age: 33
End: 2018-06-13

## 2018-06-13 VITALS — WEIGHT: 141.4 LBS | DIASTOLIC BLOOD PRESSURE: 61 MMHG | BODY MASS INDEX: 29.55 KG/M2 | SYSTOLIC BLOOD PRESSURE: 110 MMHG

## 2018-06-13 DIAGNOSIS — O09.90 HIGH RISK PREGNANCY WITH HIGH HCG: ICD-10-CM

## 2018-06-13 DIAGNOSIS — Z34.83 ENCOUNTER FOR SUPERVISION OF NORMAL PREGNANCY IN MULTIGRAVIDA IN THIRD TRIMESTER: Primary | ICD-10-CM

## 2018-06-13 DIAGNOSIS — O28.1 HIGH RISK PREGNANCY WITH HIGH HCG: ICD-10-CM

## 2018-06-13 PROCEDURE — PNV: Performed by: OBSTETRICS & GYNECOLOGY

## 2018-06-13 NOTE — ASSESSMENT & PLAN NOTE
One more growth scan at 36wks, patient worried about cost   Is going to check with her insurance  Discussed rationale behind one more growth US

## 2018-06-13 NOTE — PROGRESS NOTES
Problem List Items Addressed This Visit        Other    Encounter for supervision of normal pregnancy in multigravida in third trimester - Primary     Doing well  Some BH contractions  No bleeding, LOF, she is moving well  s/p TDAP         High risk pregnancy with high hCG     One more growth scan at 36wks, patient worried about cost   Is going to check with her insurance  Discussed rationale behind one more growth US

## 2018-06-27 ENCOUNTER — ROUTINE PRENATAL (OUTPATIENT)
Dept: OBGYN CLINIC | Facility: CLINIC | Age: 33
End: 2018-06-27

## 2018-06-27 VITALS — SYSTOLIC BLOOD PRESSURE: 122 MMHG | BODY MASS INDEX: 29.68 KG/M2 | WEIGHT: 142 LBS | DIASTOLIC BLOOD PRESSURE: 80 MMHG

## 2018-06-27 DIAGNOSIS — Z34.83 ENCOUNTER FOR SUPERVISION OF NORMAL PREGNANCY IN MULTIGRAVIDA IN THIRD TRIMESTER: ICD-10-CM

## 2018-06-27 DIAGNOSIS — Z3A.36 36 WEEKS GESTATION OF PREGNANCY: Primary | ICD-10-CM

## 2018-06-27 PROCEDURE — 87653 STREP B DNA AMP PROBE: CPT | Performed by: PHYSICIAN ASSISTANT

## 2018-06-27 PROCEDURE — PNV: Performed by: PHYSICIAN ASSISTANT

## 2018-06-27 NOTE — PROGRESS NOTES
Problem List Items Addressed This Visit     Encounter for supervision of normal pregnancy in multigravida in third trimester     Feels well overall  Good fetal movement  It's a girl - Sunny Boland  GBS done today  Has growth scan on Monday  Some cramping today  Labor precautions reviewed, last few weeks of pregnancy paper given           Other Visit Diagnoses     36 weeks gestation of pregnancy    -  Primary    Relevant Orders    Strep B DNA probe, amplification

## 2018-06-27 NOTE — ASSESSMENT & PLAN NOTE
Feels well overall  Good fetal movement  It's a girl - Diego Lady  GBS done today  Has growth scan on Monday  Some cramping today  Labor precautions reviewed, last few weeks of pregnancy paper given

## 2018-06-29 LAB — GP B STREP DNA SPEC QL NAA+PROBE: NORMAL

## 2018-07-02 ENCOUNTER — ULTRASOUND (OUTPATIENT)
Dept: PERINATAL CARE | Facility: CLINIC | Age: 33
End: 2018-07-02
Payer: COMMERCIAL

## 2018-07-02 VITALS
HEART RATE: 91 BPM | BODY MASS INDEX: 29.87 KG/M2 | DIASTOLIC BLOOD PRESSURE: 80 MMHG | HEIGHT: 58 IN | SYSTOLIC BLOOD PRESSURE: 125 MMHG | WEIGHT: 142.3 LBS

## 2018-07-02 DIAGNOSIS — Z3A.37 37 WEEKS GESTATION OF PREGNANCY: ICD-10-CM

## 2018-07-02 DIAGNOSIS — O09.90 HIGH RISK PREGNANCY WITH HIGH HCG: Primary | ICD-10-CM

## 2018-07-02 DIAGNOSIS — O28.1 HIGH RISK PREGNANCY WITH HIGH HCG: Primary | ICD-10-CM

## 2018-07-02 DIAGNOSIS — Z34.83 ENCOUNTER FOR SUPERVISION OF NORMAL PREGNANCY IN MULTIGRAVIDA IN THIRD TRIMESTER: ICD-10-CM

## 2018-07-02 PROCEDURE — 76816 OB US FOLLOW-UP PER FETUS: CPT | Performed by: OBSTETRICS & GYNECOLOGY

## 2018-07-02 NOTE — PATIENT INSTRUCTIONS

## 2018-07-03 ENCOUNTER — ROUTINE PRENATAL (OUTPATIENT)
Dept: OBGYN CLINIC | Facility: CLINIC | Age: 33
End: 2018-07-03

## 2018-07-03 VITALS — WEIGHT: 142.4 LBS | DIASTOLIC BLOOD PRESSURE: 58 MMHG | SYSTOLIC BLOOD PRESSURE: 100 MMHG | BODY MASS INDEX: 29.76 KG/M2

## 2018-07-03 DIAGNOSIS — O28.1 HIGH RISK PREGNANCY WITH HIGH HCG: ICD-10-CM

## 2018-07-03 DIAGNOSIS — Z3A.37 37 WEEKS GESTATION OF PREGNANCY: ICD-10-CM

## 2018-07-03 DIAGNOSIS — Z34.83 ENCOUNTER FOR SUPERVISION OF NORMAL PREGNANCY IN MULTIGRAVIDA IN THIRD TRIMESTER: Primary | ICD-10-CM

## 2018-07-03 DIAGNOSIS — O09.90 HIGH RISK PREGNANCY WITH HIGH HCG: ICD-10-CM

## 2018-07-03 PROCEDURE — PNV: Performed by: NURSE PRACTITIONER

## 2018-07-03 NOTE — ASSESSMENT & PLAN NOTE
Denies OB complaints  Good fetal movement  Denies contractions, cramping, leakage of fluid or vaginal bleeding  GBS neg  Baby and Me considerations reinforced  Reviewed labor precautions and FKCs

## 2018-07-03 NOTE — PROGRESS NOTES
Problem List Items Addressed This Visit     Encounter for supervision of normal pregnancy in multigravida in third trimester - Primary     Denies OB complaints  Good fetal movement  Denies contractions, cramping, leakage of fluid or vaginal bleeding  GBS neg  Baby and Me considerations reinforced  Reviewed labor precautions and FKCs  High risk pregnancy with high hCG     7/2/18 EFW 31%, AC 59%, CASTILLO 13 1            37 weeks gestation of pregnancy

## 2018-07-11 ENCOUNTER — ROUTINE PRENATAL (OUTPATIENT)
Dept: OBGYN CLINIC | Facility: CLINIC | Age: 33
End: 2018-07-11

## 2018-07-11 VITALS — WEIGHT: 142.2 LBS | DIASTOLIC BLOOD PRESSURE: 64 MMHG | SYSTOLIC BLOOD PRESSURE: 118 MMHG | BODY MASS INDEX: 29.72 KG/M2

## 2018-07-11 DIAGNOSIS — Z34.83 ENCOUNTER FOR SUPERVISION OF NORMAL PREGNANCY IN MULTIGRAVIDA IN THIRD TRIMESTER: Primary | ICD-10-CM

## 2018-07-11 PROCEDURE — PNV: Performed by: PHYSICIAN ASSISTANT

## 2018-07-11 NOTE — ASSESSMENT & PLAN NOTE
RTO 1 week  Reviewed labor precautions, fetal kick counts and reasons to call Mild respiratory distress (belly breathing, intermittent subcostal retractions) and cough. Good aeration throughout with intermittent coarse breath sounds but no wheeze.

## 2018-07-11 NOTE — PROGRESS NOTES
Patient w/o OB complaints  (+) good fetal movement, denies any bleeding, fluid leakage or ctx        Problem List Items Addressed This Visit     Encounter for supervision of normal pregnancy in multigravida in third trimester - Primary     RTO 1 week  Reviewed labor precautions, fetal kick counts and reasons to call

## 2018-07-18 ENCOUNTER — ROUTINE PRENATAL (OUTPATIENT)
Dept: OBGYN CLINIC | Facility: CLINIC | Age: 33
End: 2018-07-18

## 2018-07-18 VITALS — DIASTOLIC BLOOD PRESSURE: 58 MMHG | WEIGHT: 144 LBS | BODY MASS INDEX: 30.1 KG/M2 | SYSTOLIC BLOOD PRESSURE: 118 MMHG

## 2018-07-18 DIAGNOSIS — Z34.83 ENCOUNTER FOR SUPERVISION OF NORMAL PREGNANCY IN MULTIGRAVIDA IN THIRD TRIMESTER: Primary | ICD-10-CM

## 2018-07-18 PROBLEM — Z3A.37 37 WEEKS GESTATION OF PREGNANCY: Status: RESOLVED | Noted: 2018-07-02 | Resolved: 2018-07-18

## 2018-07-18 PROCEDURE — PNV: Performed by: OBSTETRICS & GYNECOLOGY

## 2018-07-18 NOTE — PROGRESS NOTES
Problem List Items Addressed This Visit        Other    Encounter for supervision of normal pregnancy in multigravida in third trimester     Patient has no complaints  She has normal fetal movements

## 2018-07-19 ENCOUNTER — TELEPHONE (OUTPATIENT)
Dept: OBGYN CLINIC | Facility: CLINIC | Age: 33
End: 2018-07-19

## 2018-07-19 ENCOUNTER — HOSPITAL ENCOUNTER (INPATIENT)
Facility: HOSPITAL | Age: 33
LOS: 2 days | Discharge: HOME | End: 2018-07-21
Attending: OBSTETRICS & GYNECOLOGY | Admitting: OBSTETRICS & GYNECOLOGY
Payer: COMMERCIAL

## 2018-07-19 PROBLEM — Z3A.39 39 WEEKS GESTATION OF PREGNANCY: Status: ACTIVE | Noted: 2018-07-19

## 2018-07-19 LAB
ABO GROUP BLD: NORMAL
BASE EXCESS BLDCOA CALC-SCNC: -2.4 MMOL/L (ref 3–11)
BASE EXCESS BLDCOV CALC-SCNC: 0.6 MMOL/L (ref 1–9)
BASOPHILS # BLD AUTO: 0.04 THOUSANDS/ΜL (ref 0–0.1)
BASOPHILS NFR BLD AUTO: 0 % (ref 0–1)
BLD GP AB SCN SERPL QL: NEGATIVE
EOSINOPHIL # BLD AUTO: 0.06 THOUSAND/ΜL (ref 0–0.61)
EOSINOPHIL NFR BLD AUTO: 0 % (ref 0–6)
ERYTHROCYTE [DISTWIDTH] IN BLOOD BY AUTOMATED COUNT: 13.3 % (ref 11.6–15.1)
HCO3 BLDCOA-SCNC: 23.8 MMOL/L (ref 17.3–27.3)
HCO3 BLDCOV-SCNC: 26 MMOL/L (ref 12.2–28.6)
HCT VFR BLD AUTO: 38.5 % (ref 34.8–46.1)
HGB BLD-MCNC: 13 G/DL (ref 11.5–15.4)
IMM GRANULOCYTES # BLD AUTO: 0.11 THOUSAND/UL (ref 0–0.2)
IMM GRANULOCYTES NFR BLD AUTO: 1 % (ref 0–2)
LYMPHOCYTES # BLD AUTO: 2.26 THOUSANDS/ΜL (ref 0.6–4.47)
LYMPHOCYTES NFR BLD AUTO: 15 % (ref 14–44)
MCH RBC QN AUTO: 31.4 PG (ref 26.8–34.3)
MCHC RBC AUTO-ENTMCNC: 33.8 G/DL (ref 31.4–37.4)
MCV RBC AUTO: 93 FL (ref 82–98)
MONOCYTES # BLD AUTO: 0.93 THOUSAND/ΜL (ref 0.17–1.22)
MONOCYTES NFR BLD AUTO: 6 % (ref 4–12)
NEUTROPHILS # BLD AUTO: 11.58 THOUSANDS/ΜL (ref 1.85–7.62)
NEUTS SEG NFR BLD AUTO: 78 % (ref 43–75)
NRBC BLD AUTO-RTO: 0 /100 WBCS
O2 CT VFR BLDCOA CALC: 6.2 ML/DL
OXYHGB MFR BLDCOA: 30.6 %
OXYHGB MFR BLDCOV: 45.6 %
PCO2 BLDCOA: 46.7 MM[HG] (ref 30–60)
PCO2 BLDCOV: 44.3 MM HG (ref 27–43)
PH BLDCOA: 7.33 [PH] (ref 7.23–7.43)
PH BLDCOV: 7.39 [PH] (ref 7.19–7.49)
PLATELET # BLD AUTO: 165 THOUSANDS/UL (ref 149–390)
PMV BLD AUTO: 10.4 FL (ref 8.9–12.7)
PO2 BLDCOA: 17 MM HG (ref 5–25)
PO2 BLDCOV: 20.8 MM HG (ref 15–45)
RBC # BLD AUTO: 4.14 MILLION/UL (ref 3.81–5.12)
RH BLD: POSITIVE
SAO2 % BLDCOV: 9.2 ML/DL
SPECIMEN EXPIRATION DATE: NORMAL
WBC # BLD AUTO: 14.98 THOUSAND/UL (ref 4.31–10.16)

## 2018-07-19 PROCEDURE — 10907ZC DRAINAGE OF AMNIOTIC FLUID, THERAPEUTIC FROM PRODUCTS OF CONCEPTION, VIA NATURAL OR ARTIFICIAL OPENING: ICD-10-PCS | Performed by: OBSTETRICS & GYNECOLOGY

## 2018-07-19 PROCEDURE — 86592 SYPHILIS TEST NON-TREP QUAL: CPT | Performed by: OBSTETRICS & GYNECOLOGY

## 2018-07-19 PROCEDURE — 86850 RBC ANTIBODY SCREEN: CPT | Performed by: OBSTETRICS & GYNECOLOGY

## 2018-07-19 PROCEDURE — 82805 BLOOD GASES W/O2 SATURATION: CPT | Performed by: OBSTETRICS & GYNECOLOGY

## 2018-07-19 PROCEDURE — 86900 BLOOD TYPING SEROLOGIC ABO: CPT | Performed by: OBSTETRICS & GYNECOLOGY

## 2018-07-19 PROCEDURE — 86901 BLOOD TYPING SEROLOGIC RH(D): CPT | Performed by: OBSTETRICS & GYNECOLOGY

## 2018-07-19 PROCEDURE — 59400 OBSTETRICAL CARE: CPT | Performed by: OBSTETRICS & GYNECOLOGY

## 2018-07-19 PROCEDURE — 4A1HXCZ MONITORING OF PRODUCTS OF CONCEPTION, CARDIAC RATE, EXTERNAL APPROACH: ICD-10-PCS | Performed by: OBSTETRICS & GYNECOLOGY

## 2018-07-19 PROCEDURE — 85025 COMPLETE CBC W/AUTO DIFF WBC: CPT | Performed by: OBSTETRICS & GYNECOLOGY

## 2018-07-19 RX ORDER — OXYTOCIN/RINGER'S LACTATE 30/500 ML
62.5 PLASTIC BAG, INJECTION (ML) INTRAVENOUS CONTINUOUS
Status: DISCONTINUED | OUTPATIENT
Start: 2018-07-19 | End: 2018-07-21 | Stop reason: HOSPADM

## 2018-07-19 RX ORDER — SODIUM CHLORIDE, SODIUM LACTATE, POTASSIUM CHLORIDE, CALCIUM CHLORIDE 600; 310; 30; 20 MG/100ML; MG/100ML; MG/100ML; MG/100ML
125 INJECTION, SOLUTION INTRAVENOUS CONTINUOUS
Status: DISCONTINUED | OUTPATIENT
Start: 2018-07-19 | End: 2018-07-19

## 2018-07-19 RX ORDER — CALCIUM CARBONATE 200(500)MG
1000 TABLET,CHEWABLE ORAL DAILY PRN
Status: DISCONTINUED | OUTPATIENT
Start: 2018-07-19 | End: 2018-07-21 | Stop reason: HOSPADM

## 2018-07-19 RX ORDER — ONDANSETRON 2 MG/ML
4 INJECTION INTRAMUSCULAR; INTRAVENOUS EVERY 8 HOURS PRN
Status: DISCONTINUED | OUTPATIENT
Start: 2018-07-19 | End: 2018-07-21 | Stop reason: HOSPADM

## 2018-07-19 RX ORDER — OXYTOCIN/RINGER'S LACTATE 30/500 ML
250 PLASTIC BAG, INJECTION (ML) INTRAVENOUS CONTINUOUS
Status: ACTIVE | OUTPATIENT
Start: 2018-07-19 | End: 2018-07-19

## 2018-07-19 RX ORDER — OXYCODONE HYDROCHLORIDE AND ACETAMINOPHEN 5; 325 MG/1; MG/1
1 TABLET ORAL EVERY 4 HOURS PRN
Status: DISCONTINUED | OUTPATIENT
Start: 2018-07-19 | End: 2018-07-21 | Stop reason: HOSPADM

## 2018-07-19 RX ORDER — ACETAMINOPHEN 325 MG/1
650 TABLET ORAL EVERY 6 HOURS PRN
Status: DISCONTINUED | OUTPATIENT
Start: 2018-07-19 | End: 2018-07-21 | Stop reason: HOSPADM

## 2018-07-19 RX ORDER — OXYCODONE HYDROCHLORIDE AND ACETAMINOPHEN 5; 325 MG/1; MG/1
2 TABLET ORAL EVERY 4 HOURS PRN
Status: DISCONTINUED | OUTPATIENT
Start: 2018-07-19 | End: 2018-07-21 | Stop reason: HOSPADM

## 2018-07-19 RX ORDER — IBUPROFEN 600 MG/1
600 TABLET ORAL EVERY 6 HOURS PRN
Status: DISCONTINUED | OUTPATIENT
Start: 2018-07-19 | End: 2018-07-21 | Stop reason: HOSPADM

## 2018-07-19 RX ORDER — DOCUSATE SODIUM 100 MG/1
100 CAPSULE, LIQUID FILLED ORAL 2 TIMES DAILY
Status: DISCONTINUED | OUTPATIENT
Start: 2018-07-19 | End: 2018-07-21 | Stop reason: HOSPADM

## 2018-07-19 RX ORDER — SODIUM CHLORIDE, SODIUM LACTATE, POTASSIUM CHLORIDE, CALCIUM CHLORIDE 600; 310; 30; 20 MG/100ML; MG/100ML; MG/100ML; MG/100ML
125 INJECTION, SOLUTION INTRAVENOUS CONTINUOUS
Status: DISCONTINUED | OUTPATIENT
Start: 2018-07-19 | End: 2018-07-21 | Stop reason: HOSPADM

## 2018-07-19 RX ORDER — OXYTOCIN/RINGER'S LACTATE 30/500 ML
PLASTIC BAG, INJECTION (ML) INTRAVENOUS
Status: DISPENSED
Start: 2018-07-19 | End: 2018-07-20

## 2018-07-19 RX ORDER — DIAPER,BRIEF,INFANT-TODD,DISP
1 EACH MISCELLANEOUS AS NEEDED
Status: DISCONTINUED | OUTPATIENT
Start: 2018-07-19 | End: 2018-07-21 | Stop reason: HOSPADM

## 2018-07-19 NOTE — TELEPHONE ENCOUNTER
Pt called office today, left voicemail message  Returned Pt's call today  Pt's ROCIO is 18, GA 39 wks + 3 dys,  3 Para2  Pt states she saw Dr Arin Burt yesterday, he informed her that she was dilated over 3 cms  Pt further states she has lower pelvic pressure and believes that she passed her mucous plug  Pt does not report any adverse symptoms at this time  Pt reports positive fetal movement  Pt states she wants to know if she can go to work  Pt symptoms addressed with EDI Sheehan (triage clinical supervisor)  Pt informed that she can go to work, encouraged to stay hydrated  Reiterated to Pt that if she experiences any changes/symptoms worsen, to contact office

## 2018-07-20 LAB — RPR SER QL: NORMAL

## 2018-07-20 NOTE — DISCHARGE SUMMARY
Discharge Summary - OB/GYN   Suzi Concepcion 34 y o  female MRN: [id number]  Unit/Bed#: -01 Encounter: [id number]      Admission Date: 2018     Discharge Date: 18    Admitting Diagnosis:   1  Pregnancy at 39w3d  2  Active labor    Discharge Diagnosis:   Same, delivered      Procedures: spontaneous vaginal delivery    Attending: Raheem Hirsch MD    Hospital Course:     Suzi Concepcion is a 34 y o   at 39w3d wks who was initially admitted for active labor   She delivered a viable female  on 2018 at 2051  Weight 6lbs 6 1oz via spontaneous vaginal delivery  Apgars were 9 (1 min) and 9 (5 min)   was transferred to  nursery  Patient tolerated the procedure well and was transferred to recovery in stable condition  Her post-partum course was uncomplicated  Pre-delivery hemaglobin was 13  Her postpartum pain was well controlled  On the day of discharge, she was ambulating and able to reasonably perform all ADLs  She was voiding and had appropriate bowel function  Pain was well controlled  She was discharged home on post-partum day #2 without complications  Patient was instructed to follow up with her OB as an outpatient and was given appropriate warnings to call provider if she develops signs of infection or uncontrolled pain  Complications: none apparent    Condition at discharge: stable     Discharge instructions/Information to patient and family:   See after visit summary for information provided to patient and family  Provisions for Follow-Up Care:  See after visit summary for information related to follow-up care and any pertinent home health orders  Disposition: Home    Planned Readmission: No    Discharge Medications: For a complete list of the patient's medications, please refer to her med rec

## 2018-07-20 NOTE — LACTATION NOTE
This note was copied from a baby's chart  CONSULT - LACTATION  Baby Girl  Analia Whitten 1 days female MRN: [id number]    [facility] Room / Bed: (N)/(N) Encounter: [id number]    Maternal Information     MOTHER:  Laura Whitten  Maternal Age: 34 y o    OB History: #: 1, Date: 09, Sex: Male, Weight: 2778 g (6 lb 2 oz), GA: 38w0d, Delivery: Vaginal, Vacuum (Extractor), Apgar1: None, Apgar5: None, Living: Living, Birth Comments: None    #: 2, Date: 14, Sex: Male, Weight: 2948 g (6 lb 8 oz), GA: 39w0d, Delivery: Vaginal, Spontaneous Delivery, Apgar1: None, Apgar5: None, Living: Living, Birth Comments: None    #: 3, Date: 18, Sex: Female, Weight: 2895 g (6 lb 6 1 oz), GA: 39w3d, Delivery: Vaginal, Spontaneous Delivery, Apgar1: 9, Apgar5: 9, Living: Living, Birth Comments: None   Previouse breast reduction surgery? No    Lactation history:   Has patient previously breast fed: Yes   How long had patient previously breast fed: 14 months each child   Previous breast feeding complications:  Other (Comment) (multiple times having mastitis for first child)     Past Surgical History:   Procedure Laterality Date    WISDOM TOOTH EXTRACTION         Birth information:  YOB: 2019   Time of birth: 8:51 PM   Sex: female   Delivery type: Vaginal, Spontaneous Delivery   Birth Weight: 2895 g (6 lb 6 1 oz)   Percent of Weight Change: 0%     Gestational Age: 38w3d   [unfilled]    Assessment     Breast and nipple assessment: large nipples    Clayton Assessment: normal assessment    Feeding assessment: feeding well  LATCH:  Latch: Grasps breast, tongue down, lips flanged, rhythmic sucking   Audible Swallowing: Spontaneous and intermittent (24 hours old)   Type of Nipple: Everted (After stimulation)   Comfort (Breast/Nipple): Engorged, cracked, bleeding, large blisters or bruises   Hold (Positioning): Full assist, teach one side, mother does other, staff holds (carpal tunnel)   LATCH Score: 7          Feeding recommendations:  breast feed on demand     Jerry Ordonez is challenged by several things: large bulbous nipples making transfer of milk minimal, no wet diapers at 22 hours of age, mom's history of multiple diagnosis with mastitis in the past, carpal tunnel syndrome making positioning challenging  Showed Laura how to manually express and demonstrated what cup feeding would look like if she were to decide to implement this method to create more intake and output  Discussed 2nd night syndrome and ways to calm infant  Hand out given  Information on hand expression given  Discussed benefits of knowing how to manually express breast including stimulating milk supply, softening nipple for latch and evacuating breast in the event of engorgement  Met with mother  Provided mother with Ready, Set, Baby booklet  Discussed Skin to Skin contact an benefits to mom and baby  Talked about the delay of the first bath until baby has adjusted  Spoke about the benefits of rooming in  Feeding on cue and what that means for recognizing infant's hunger  Avoidance of pacifiers for the first month discussed  Talked about exclusive breastfeeding for the first 6 months  Positioning and latch reviewed as well as showing images of other feeding positions  Discussed the properties of a good latch in any position  Reviewed hand/manual expression  Discussed s/s that baby is getting enough milk and some s/s that breastfeeding dyad may need further help  Gave information on common concerns, what to expect the first few weeks after delivery, preparing for other caregivers, and how partners can help  Resources for support also provided  Encouraged MOB to call for assistance, questions, and concerns about breastfeeding  Extension provided          Mikael Rivera RN 7/20/2018 7:42 PM

## 2018-07-20 NOTE — PROGRESS NOTES
Progress Note - OB/GYN   Bharti Coughlin 34 y o  female MRN: [id number]  Unit/Bed#: -01 Encounter: [id number]    Assessment:  Post partum Day #1 s/p , stable, baby in room with mom    Plan:  1) Continue routine post partum care   Encourage ambulation   Encourage breastfeeding   Anticipate discharge 18     Subjective/Objective   Chief Complaint:     Post delivery  Patient is doing well  Lochia WNL  Pain well controlled  Subjective:     Pain: yes, cramping, improved with meds  Tolerating PO: yes  Voiding: yes  Flatus: yes  BM: no  Ambulating: yes  Breastfeeding:  yes  Chest pain: no  Shortness of breath: no  Leg pain: no  Lochia: minimal    Objective:     Vitals: /58 (BP Location: Right arm)   Pulse 60   Temp 98 2 °F (36 8 °C) (Oral)   Resp 18   Ht 4' 10" (1 473 m)   Wt 65 3 kg (144 lb)   LMP 10/16/2017   SpO2 98%   Breastfeeding? Yes   BMI 30 10 kg/m²       Intake/Output Summary (Last 24 hours) at 18 0649  Last data filed at 18 2320   Gross per 24 hour   Intake                0 ml   Output              550 ml   Net             -550 ml       Lab Results   Component Value Date    WBC 14 98 (H) 2018    HGB 13 0 2018    HCT 38 5 2018    MCV 93 2018     2018       Physical Exam:     Gen: AAOx3, NAD  CV: RRR  Lungs: CTA b/l  Abd: Soft, non-tender, non-distended, no rebound or guarding  Uterine fundus firm and non-tender, -1 cm below the umbilicus     Ext: Non tender    Freeda Schirmer, MD  2018  6:49 AM

## 2018-07-20 NOTE — DISCHARGE INSTRUCTIONS
Vaginal Delivery   WHAT YOU NEED TO KNOW:   A vaginal delivery occurs when your baby is born through your vagina (birth canal)  DISCHARGE INSTRUCTIONS:   Seek care immediately if:   · Your leg feels warm, tender, and painful  It may look swollen and red  · You have a fever  · You are urinating very little, or not at all  · You have heavy vaginal bleeding that fills 1 or more sanitary pads in 1 hour  · You feel weak, dizzy, or faint  Contact your healthcare provider if:   · Your abdominal or perineal pain does not go away, or gets worse  · You feel depressed  · You have questions or concerns about your condition or care  Medicines:  · NSAIDs , such as ibuprofen, help decrease swelling, pain, and fever  This medicine is available with or without a doctor's order  NSAIDs can cause stomach bleeding or kidney problems in certain people  If you take blood thinner medicine, always ask your healthcare provider if NSAIDs are safe for you  Always read the medicine label and follow directions  · Stool softeners  make it easier for you to have a bowel movement  You may need this medicine to treat or prevent constipation  · Take your medicine as directed  Contact your healthcare provider if you think your medicine is not helping or if you have side effects  Tell him or her if you are allergic to any medicine  Keep a list of the medicines, vitamins, and herbs you take  Include the amounts, and when and why you take them  Bring the list or the pill bottles to follow-up visits  Carry your medicine list with you in case of an emergency  Follow up with your healthcare provider:  Most women need to return 6 weeks after a vaginal delivery  Ask your healthcare provider how to care for your wounds or stitches, if you have them  Write down your questions so you remember to ask them during your visits  Activity:  Rest as much as possible  Try to keep all activities short   You may be able to do some exercise soon after you have your baby  Talk with your healthcare provider before you start exercising  If you work outside the home, ask when you can return to your job  Kegel exercises:  Kegel exercises may help your vaginal and rectal muscles heal faster  You can do Kegel exercises by tightening and relaxing the muscles around your vagina  Kegel exercises help make the muscles stronger  Breast care:  When your milk comes in, your breasts may feel full and hard  Ask how to care for your breasts, even if you are not breastfeeding  Constipation:  You may have constipation for a period of time after you have your baby  Do not try to push the bowel movement out if it is too hard  High-fiber foods and extra liquids can help you prevent constipation  Examples of high-fiber foods are fruit and bran  Prune juice and water are good liquids to drink  You may also be told to take over-the-counter fiber and stool softener medicines  Take these items as directed  Ask how to prevent or treat hemorrhoids  Perineum care: Your perineum is the area between your vagina and anus  Keep the area clean and dry  This will help it heal and prevent infection  Wash the area gently with soap and water when you bathe or shower  Rinse your perineum with warm water after you urinate or have a bowel movement  Your healthcare provider may suggest you use a warm sitz bath to help decrease pain  To take a sitz bath, fill a bathtub with 4 to 6 inches of warm water  You may also use a sitz bath pan that fits inside the toilet  Sit in the sitz bath for 20 minutes  Do this 2 to 3 times a day, or as directed  The warm water can help decrease pain and swelling  Vaginal discharge: You will have vaginal discharge, called lochia, after your delivery  The lochia is red or dark brown with clots for 1 to 3 days after the birth  The amount will decrease and turn pale pink or brown for 3 to 10 days  It will turn white or yellow on the 10th or 14th day  Lochia is usually gone within 3 weeks  Use a sanitary pad rather than a tampon to prevent a vaginal infection  You will have lochia for up to 3 weeks after your baby is born  Monthly periods: Your period may start again within 7 to 9 weeks after your baby is born  If you are breastfeeding, it may take longer for your period to start again  You can still get pregnant again even though you do not have your monthly period  Talk with your healthcare provider about a birth control method if you do not want to get pregnant  Mood changes: Many new mothers have some kind of mood changes after delivery  Some of these changes occur because of lack of sleep, hormone changes, and caring for a new baby  Some mood changes can be more serious, such as postpartum depression  Talk with your healthcare provider if you feel unable to care for yourself or your baby  Sexual activity:  Do not have sex until your healthcare provider says it is okay  You may notice you have a decreased desire for sex, or sex may be painful  You may need to use a vaginal lubricant (gel) to help make sex more comfortable  © 2017 [facility] Information is for End User's use only and may not be sold, redistributed or otherwise used for commercial purposes  All illustrations and images included in CareNotes® are the copyrighted property of A D A M , Inc  or [employer]ish Monteiro  The above information is an  only  It is not intended as medical advice for individual conditions or treatments  Talk to your doctor, nurse or pharmacist before following any medical regimen to see if it is safe and effective for you

## 2018-07-20 NOTE — L&D DELIVERY NOTE
Delivery Summary - OB/GYN   Keri Coyote 34 y o  female MRN: [id number]  Unit/Bed#: -01 Encounter: [id number]    Pre-delivery Diagnosis:   1  39w3d pregnancy  2  Active labor    Post-delivery Diagnosis: same, delivered    Attending: Dr Bee Jones    Assistant(s): Milla Honeycutt MD    Procedure:     Anesthesia: No epidural    Estimated Blood Loss:  250 mL    Specimens:   1  Arterial and venous cord gases  2  Cord blood  3  Segment of umbilical cord  4  Placenta to storage     Complications:  None apparent    Findings:  1  Viable female  delivered on 18 at  weighing 6lbs 6 1oz;  Apgar scores of 9 at one minute and 9 at five minutes  2  Spontaneous delivery of placenta with centrally inserted 3-vessel cord         Disposition: Patient tolerated the procedure well and was recovering in labor and delivery room with family and  before being transferred to the post-partum floor  Procedure Details     Description of procedure    After pushing for 1 minutes, on 18 at  patient delivered a viable male or female , weighing 2895g, Apgars of 9 (1 min) and 9 (5 min)  The fetal vertex delivered spontaneously  There was 1 nuchal cord  The anterior shoulder delivered atraumatically with maternal expulsive forces and the assistance of downward traction  The posterior shoulder delivered with maternal expulsive forces and the assistance of upward traction  The remainder of the fetus delivered spontaneously  Upon delivery, the infant was placed on the mothers abdomen and the cord was clamped and cut  The infant was noted to cry spontaneously and was moving all extremities appropriately  There was no evidence for injury  Awaiting nurse resuscitators evaluated the  at bedside  Arterial and venous cord blood gases and cord blood was collected for analysis  These were promptly sent to the lab   In the immediate post-partum, 30 units of IV pitocin was administered and the uterus was noted to contract down well with massage and pitocin  The placenta delivered spontaneously at  and was noted to have a centrally inserted 3 vessel cord  The vagina, cervix, and perineum were inspected and there was noted to be intact  At the conclusion of the delivery, all needle, sponge, and instrument counts were noted to be correct  Patient tolerated the procedure well and was allowed to recover in labor and delivery room with family and  before being transferred to the post-partum floor  Dr Jorge Holloway was present and participated in all key portions of the case

## 2018-07-20 NOTE — H&P
H&P Exam - Obstetrics   Vikash Montemayor 34 y o  female MRN: [id number]  Unit/Bed#: -01 Encounter: [id number]      History of Present Illness     Chief Complaint:  Active labor    HPI:  Vikash Montemayor is a 34 y o   female with an ROCIO of 2018, by Last Menstrual Period at 39w3d weeks gestation who is being admitted for labor  Contractions: yes  Loss of fluid: no  Vaginal bleeding: no  Fetal movement: good    She is Dr Pipe Katz patient  PREGNANCY COMPLICATIONS:   1) High risk pregnancy with high hcg  2)Eczema      OB History    Para Term  AB Living   3 2 2     2   SAB TAB Ectopic Multiple Live Births           2      # Outcome Date GA Lbr Ilia/2nd Weight Sex Delivery Anes PTL Lv   3 Current            2 Term 14 39w0d  2948 g (6 lb 8 oz) M Vag-Spont None  DUNCAN   1 Term 09 38w0d  2778 g (6 lb 2 oz) M Vag-Vacuum EPI  DUNCAN          Baby complications/comments: none    Review of Systems   All other systems reviewed and are negative          Historical Information   Past Medical History:   Diagnosis Date    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     Eczema     Last Assessed: 2013    Forceps delivery     2009 son    Latent tuberculosis     crx 3/10/2011 neg chest; pt on isoniazid 3/11/11 for 9 mths; quanteferon blood test was neg on 13    Normal delivery      son    Varicella     childhood     Past Surgical History:   Procedure Laterality Date    WISDOM TOOTH EXTRACTION       Social History   History   Alcohol Use No     History   Drug Use No     History   Smoking Status    Never Smoker   Smokeless Tobacco    Never Used     Family History: non-contributory    Meds/Allergies      Prescriptions Prior to Admission   Medication    ferrous sulfate 325 (65 Fe) mg tablet    Prenat w/o L-CE-Geocbsr-FA-DHA (PRENATE DHA) 28-0 6-0 4-300 MG CAPS      No Known Allergies    OBJECTIVE:    Vitals: Blood pressure 127/63, pulse 82, temperature 98 4 °F (36 9 °C), temperature source Oral, resp  rate 22, height 4' 10" (1 473 m), weight 65 3 kg (144 lb), last menstrual period 10/16/2017, SpO2 98 %, currently breastfeeding  Body mass index is 30 1 kg/m²  Physical Exam   Constitutional: She appears well-developed and well-nourished  No distress  Abdominal: Soft  She exhibits no distension  There is no tenderness  Skin: Skin is warm  She is not diaphoretic  Psychiatric: She has a normal mood and affect  Her behavior is normal          Cervix:  Dilation: 9  Effacement (%): 100  Station: 0  Cervical Characteristics: Anterior    Fetal heart rate:   Baseline Rate: 140 bpm  Variability: Moderate 6-25 bpm  Decelerations: Variable, Periodic, Intermittent  FHR Category: Category II    [place]:   Contraction Frequency (minutes): 1 5-2  Contraction Duration (seconds): 50-80  Contraction Quality: Strong    EFW: 7lb  GBS: Negative    Prenatal Labs: I have personally reviewed pertinent reports  Invasive Devices     Peripheral Intravenous Line            Peripheral IV 18 Left Hand less than 1 day                  Assessment/Plan     ASSESSMENT:  33yo  at 39w3d weeks gestation who is being admitted for labor  PLAN:   1) Admit   2) CBC, RPR, Blood Type   3) Start with expectant management   4) GBS - status: no PCN for prophylaxis    5) Analgesia and/or epidural at patient request   6) Anticipate    7) Discussed with Dr Brown Fix      This patient will be an INPATIENT  and I certify the anticipated length of stay is >2 Midnights      Yolanda Leonard MD    2:89 PM

## 2018-07-21 VITALS
HEIGHT: 58 IN | HEART RATE: 70 BPM | TEMPERATURE: 98.7 F | SYSTOLIC BLOOD PRESSURE: 118 MMHG | WEIGHT: 144 LBS | RESPIRATION RATE: 20 BRPM | BODY MASS INDEX: 30.23 KG/M2 | OXYGEN SATURATION: 98 % | DIASTOLIC BLOOD PRESSURE: 56 MMHG

## 2018-07-21 PROCEDURE — 99024 POSTOP FOLLOW-UP VISIT: CPT | Performed by: OBSTETRICS & GYNECOLOGY

## 2018-07-21 RX ORDER — DOCUSATE SODIUM 100 MG/1
100 CAPSULE, LIQUID FILLED ORAL 2 TIMES DAILY
Qty: 10 CAPSULE | Refills: 0 | Status: SHIPPED | OUTPATIENT
Start: 2018-07-21 | End: 2018-08-08 | Stop reason: ALTCHOICE

## 2018-07-21 NOTE — PLAN OF CARE
DISCHARGE PLANNING     Discharge to home or other facility with appropriate resources Adequate for Discharge        INFECTION - ADULT     Absence or prevention of progression during hospitalization Adequate for Discharge     Absence of fever/infection during neutropenic period Adequate for Discharge        Knowledge Deficit     Patient/family/caregiver demonstrates understanding of disease process, treatment plan, medications, and discharge instructions Adequate for Discharge        PAIN - ADULT     Verbalizes/displays adequate comfort level or baseline comfort level Adequate for Discharge        POSTPARTUM     Experiences normal postpartum course Adequate for Discharge     Appropriate maternal -  bonding Adequate for Discharge     Establishment of infant feeding pattern Adequate for Discharge        SAFETY ADULT     Patient will remain free of falls Adequate for Discharge     Maintain or return to baseline ADL function Adequate for Discharge     Maintain or return mobility status to optimal level Adequate for Discharge

## 2018-07-21 NOTE — PROGRESS NOTES
POSTPARTUM NOTE  Eleuterio Perry 34 y o  female MRN: [id number]  Unit/Bed#: -01 Encounter: [id number]    Date: 07/21/18  Procedure: Spontaneous Vaginal Delivery  Postpartum/Postop Day #: 2     SUBJECTIVE:  Post-partum day 2, pt reports to be doing well  Pain: yes - intermittent cramps, mild   Tolerating Oral Intake: yes   Voiding: yes  Flatus: yes  Bowel Movement: yes  Ambulating: yes   Breastfeeding: yes  Chest Pain: no  Shortness of Breath: no  Leg Pain/Discomfort: no  Lochia: moderate      OBJECTIVE:   Vitals: Temp:  [97 6 °F (36 4 °C)-98 8 °F (37 1 °C)] 98 1 °F (36 7 °C)  HR:  [70-84] 70  Resp:  [18-20] 18  BP: (106-134)/(57-67) 106/57  General: No Acute Distress, alert  Cardiovascular: Regular, Rate and Rhythm, no murmur, normal S1/S2   Lungs: Clear to Auscultation Bilaterally, no wheezing, non-labored breathing   Abdomen: Soft, non-distended, non-tender, no rebound, guarding or CVA tenderness   Fundus: Firm & Non-Tender, Fundal Location:  -1 cm below the umbilicus  Lower Extremities: Non-tender, no peripheral Edema       LABS / TESTS / MEDICATION:    Recent Results (from the past 72 hour(s))   Blood gas, arterial, cord    Collection Time: 07/19/18  8:57 PM   Result Value Ref Range    pH, Cord Art 7 326 7 230 - 7 430    pCO2, Cord Art 46 7 30 0 - 60 0    pO2, Cord Art 17 0 5 0 - 25 0 mm HG    HCO3, Cord Art 23 8 17 3 - 27 3 mmol/L    Base Exc, Cord Art -2 4 (L) 3 0 - 11 0 mmol/L    O2 Content, Cord Art 6 2 ml/dl    O2 Hgb, Arterial Cord 30 6 %   Blood gas, venous, cord    Collection Time: 07/19/18  8:57 PM   Result Value Ref Range    pH, Cord Ken 7 386 7 190 - 7 490    pCO2, Cord Ken 44 3 (H) 27 0 - 43 0 mm HG    pO2, Cord Ken 20 8 15 0 - 45 0 mm HG    HCO3, Cord Ken 26 0 12 2 - 28 6 mmol/L    Base Exc, Cord Ken 0 6 (L) 1 0 - 9 0 mmol/L    O2 Cont, Cord Ken 9 2 mL/dL    O2 HGB,VENOUS CORD 45 6 %   CBC and differential    Collection Time: 07/19/18  9:21 PM   Result Value Ref Range    WBC 14 98 (H) 4 31 - 10 16 Thousand/uL    RBC 4 14 3 81 - 5 12 Million/uL    Hemoglobin 13 0 11 5 - 15 4 g/dL    Hematocrit 38 5 34 8 - 46 1 %    MCV 93 82 - 98 fL    MCH 31 4 26 8 - 34 3 pg    MCHC 33 8 31 4 - 37 4 g/dL    RDW 13 3 11 6 - 15 1 %    MPV 10 4 8 9 - 12 7 fL    Platelets 162 353 - 330 Thousands/uL    nRBC 0 /100 WBCs    Neutrophils Relative 78 (H) 43 - 75 %    Immat GRANS % 1 0 - 2 %    Lymphocytes Relative 15 14 - 44 %    Monocytes Relative 6 4 - 12 %    Eosinophils Relative 0 0 - 6 %    Basophils Relative 0 0 - 1 %    Neutrophils Absolute 11 58 (H) 1 85 - 7 62 Thousands/µL    Immature Grans Absolute 0 11 0 00 - 0 20 Thousand/uL    Lymphocytes Absolute 2 26 0 60 - 4 47 Thousands/µL    Monocytes Absolute 0 93 0 17 - 1 22 Thousand/µL    Eosinophils Absolute 0 06 0 00 - 0 61 Thousand/µL    Basophils Absolute 0 04 0 00 - 0 10 Thousands/µL   Type and screen    Collection Time: 07/19/18  9:21 PM   Result Value Ref Range    ABO Grouping A     Rh Factor Positive     Antibody Screen Negative     Specimen Expiration Date [id number]    RPR    Collection Time: 07/19/18  9:21 PM   Result Value Ref Range    RPR Non-Reactive Non-Reactive         docusate sodium 100 mg Oral BID       acetaminophen 650 mg Q6H PRN   benzocaine-menthol-lanolin-aloe  4x Daily PRN   calcium carbonate 1,000 mg Daily PRN   hydrocortisone 1 application PRN   ibuprofen 600 mg Q6H PRN   ondansetron 4 mg Q8H PRN   oxyCODONE-acetaminophen 1 tablet Q4H PRN   oxyCODONE-acetaminophen 2 tablet Q4H PRN   witch hazel-glycerin 1 pad Q2H PRN       ASSESSMENT:   34 y o  [id number] s/p Spontaneous Vaginal Delivery Postpartum day  2  Stable, baby in room with mom    PLAN:  Continue routine postpartum care, encourage ambulation, encourage breastfeeding  Plan for dc today    Signature / Title: Camryn Hsieh DO  Date: 7/21/2018  Time: 6:16 AM

## 2018-07-23 ENCOUNTER — OFFICE VISIT (OUTPATIENT)
Dept: POSTPARTUM | Facility: CLINIC | Age: 33
End: 2018-07-23
Payer: COMMERCIAL

## 2018-07-23 VITALS — TEMPERATURE: 98.7 F | DIASTOLIC BLOOD PRESSURE: 76 MMHG | SYSTOLIC BLOOD PRESSURE: 118 MMHG

## 2018-07-23 DIAGNOSIS — O92.13 CRACKED NIPPLE ASSOCIATED WITH LACTATION: ICD-10-CM

## 2018-07-23 DIAGNOSIS — R52 PAIN AGGRAVATED BY BREAST FEEDING: ICD-10-CM

## 2018-07-23 DIAGNOSIS — Z71.89 ENCOUNTER FOR BREAST FEEDING COUNSELING: Primary | ICD-10-CM

## 2018-07-23 DIAGNOSIS — O92.79 PAIN AGGRAVATED BY BREAST FEEDING: ICD-10-CM

## 2018-07-23 PROCEDURE — 99404 PREV MED CNSL INDIV APPRX 60: CPT

## 2018-07-23 NOTE — PATIENT INSTRUCTIONS
Plan for breastfeeding    Reassurance and support given  Reviewed early signs of hunger, including tensing of hands and shoulders - no need to wait for open eyes  Offer the breast at early feeding  cues to help make latching easier  Reviewed normal sucking patterns: transition from stimulation to nutritive to release or non-nutritive  Watch for sustained periods of active suckling followed by swallowing  Reviewed normal nursing pattern: infant should nurse for at least 5 minutes or until releases on own  Discussed difference in sensation of non-nutritive v nutritive sucking  Increase frequency of expression  Pump to empty your breasts as often as baby is feeding to help establish a healthy milk supply  Supplementation recommended (document method-education if necessary)  Expressed breast milk or formula as needed via paced bottle feeding  Use of pump demonstrated  The 30mm flanges appear to be a more appropriate fit for you  Breast milk storage discussed: 6-8 hours at room temp, 2-3 months freezer  Do not add fresh milk to frozen   Offer the breast as often as you are comfortable  As Alexandra Leak gets bigger and you feel better, latching should get easier  For a deep effective latch, Compress your breast to make it narrow in the same direction your baby's  mouth is positioned  Move your baby onto your breast so their chin touches first and their head tips back  Your nipple should be at their nose  When they open their mouth wide, move them onto the breast so your nipple enters their mouth pointing upward  Position her close to you supported on pillows or blankets so you don't have to work so hard to hold her up  If the latch hurts, stop and try again  When pumping, Cycle your pump through stimulation and expression mode several times in a session to stimulate several let downs  Use hands on pumping and hand expression to increase your output  Maintain your pump as recommended     To help your nipples heal, in addition to paying close attention to latch, apply protective ointment after feeding or pumping and cover with an occlusive dressing like wax paper  Do this until your nipples have completely healed  Please call with any questions or concerns

## 2018-07-23 NOTE — PROGRESS NOTES
INITIAL BREAST FEEDING EVALUATION    Informant/Relationship: Laura    Discussion of General Lactation Issues: Frankie Platt has concerns about Naeem Harris getting enough milk  Baby was not voiding the first day in the hospital   Latch is painful as well  Frankie Platt also has carpal tunnel syndrome in both hands and is having difficulty positioning baby for feedings  [de-identified] Pediatrician recommended formula supplementation due to her weight loss  Infant is 3days old today   History:  Fertility Problem:no  Breast changes:yes - Breasts got brizuela  : yes - not induced  Full term:yes - 44 + weeks   labor:no  First nursing/attempt < 1 hour after birth:yes - feeding was painful for Laura  Skin to skin following delivery:yes - for at least 2 hours  Breast changes after delivery:yes - milk coming in  Rooming in (infant in room with mother with exception of procedures, eg  Circumcision: yes - stayed with parents the entire stay    Blood sugar issues:no  NICU stay:no  Jaundice:no  Phototherapy:no  Supplement given: (list supplement and method used as well as reason(s):no    Past Medical History:   Diagnosis Date    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     Eczema     Last Assessed: 2013    Forceps delivery      son    Latent tuberculosis     crx 3/10/2011 neg chest; pt on isoniazid 3/11/11 for 9 mths; quanteferon blood test was neg on 13    Normal delivery      son    Varicella     childhood         Current Outpatient Prescriptions:     docusate sodium (COLACE) 100 mg capsule, Take 1 capsule (100 mg total) by mouth 2 (two) times a day, Disp: 10 capsule, Rfl: 0    ferrous sulfate 325 (65 Fe) mg tablet, Take 325 mg by mouth daily with breakfast, Disp: , Rfl:     Prenat w/o R-GD-Asqjhos-FA-DHA (PRENATE DHA) 28-0 6-0 4-300 MG CAPS, Take by mouth, Disp: , Rfl:     No Known Allergies    History   Drug Use No       Social History Never a smoker    Interval Breastfeeding History:    Frequency of breast feeding: Attempting at least every 3 hours  Does mother feel breastfeeding is effective: No  Does infant appear satisfied after nursing:No  Stooling pattern normal: Yes  Urinating frequently:Yes  Using shield or shells: No    Alternative/Artificial Feedings:   Bottle: Yes, 5-6 times to supplement  Cup: No  Syringe/Finger: No           Formula Type: Similac For Supplementation                     Amount: 1-1 5 ounces            Breast Milk:                      Amount: 1-1 5 ounces            Frequency Q 3 Hr between feedings  Elimination Problems: No      Equipment:  Nipple Shield             Type: none             Size: n/a             Frequency of Use: n/a  Pump            Type: Medela Pump in Style            Frequency of Use: about 6 times since yesterday  Expressed 1-2 ounces each session  Shells            Type: none            Frequency of use: n/a    Equipment Problems: no    Mom:  Breast: small asymmetrical breasts  Nipple Assessment in General: Very large nipples  Healing abrasion on the face of both nipples  Mother's Awareness of Feeding Cues                 Recognizes: Yes                  Verbalizes: Yes  Support System:FOB  History of Breastfeeding:  two prior children for 14 months each  Changes/Stressors/Violence: Skip Redd is concerned about Joycelyn's weight loss  Concerns/Goals: Skip Redd would like to breastfeed Abby Hannon for at least 12 months    Problems with Mom: Pain with latch    Physical Exam   Constitutional: She is oriented to person, place, and time  She appears well-developed and well-nourished  HENT:   Head: Normocephalic and atraumatic  Neck: Normal range of motion  Cardiovascular: Normal rate, regular rhythm and normal heart sounds  Pulmonary/Chest: Effort normal and breath sounds normal    Musculoskeletal: Normal range of motion  Neurological: She is alert and oriented to person, place, and time  Skin: Skin is warm and dry     Psychiatric: She has a normal mood and affect  Her behavior is normal  Judgment and thought content normal        Infant:  Behaviors: Alert  Color: Pink  Birth weight: 2895gram  Current weight: 2750gram    Problems with infant: Weight loss, breastfeeding difficulty      General Appearance:  Alert, active, no distress                            Head:  Normocephalic, AFOF, sutures                             Eyes:   Conjunctiva clear, no drainage                            Ears:   Normally placed, no anomolies                           Nose:   no drainage or erythema                          Mouth:  No lesions  Tongue extends, elevates and lateralizes well  Strong coordinated suck                   Neck:  Supple, symmetrical, trachea midline, no adenopathy; thyroid: no enlargement, symmetric, no tenderness/mass/nodules                Respiratory:  No grunting, flaring, retractions, breath sounds clear and equal           Cardiovascular:  Regular rate and rhythm  No murmur  Adequate perfusion/capillary refill  Femoral pulse present                  Abdomen:    Soft, non-tender, no masses, bowel sounds present, no HSM            Genitourinary:  Normal female genitalia, anus patent                         Spine:   No abnormalities noted       Musculoskeletal:   Full range of motion         Skin/Hair/Nails:   Skin warm, dry, and intact, no rashes or abnormal dyspigmentation or lesions               Neurologic:   No abnormal movement, tone appropriate for gestational age     Latch:  Efficiency:               Lips Flanged: Yes              Depth of latch: good              Audible Swallow: Yes              Visible Milk: Yes              Wide Open/ Asymmetrical: Yes              Suck Swallow Cycle: Breathing: unlabored, Coordinated: yes  Nipple Assessment after latch: Some distortion of the nipple  "lipstick" appearance  Latch Problems: Margarita Wayne has some limitations due to pain of carpal tunnel syndrome    Stephanie Ibarra had trouble latching deeply due to the size of Laura's nipples  Several times, she would lose the latch and slide onto the nipple which was painful for Laura  We were able to achieve a deep effective latch a few times and Catherine Rodriguezmariyarene nursed well briefly on both breasts  Position:  Infant's Ergonomics/Body               Body Alignment: Yes               Head Supported: Yes               Close to Mom's body/ Lifted/ Supported: Yes               Mom's Ergonomics/Body: Yes                           Supported: Yes                           Sitting Back: Yes                           Brings Baby to her breast: Yes  Positioning Problems: Nakia Egan has limitations currently due to her wrist pain  She was able to use her hand to compress her breast for latch and then remove it to relax on the arm of the chair  Alannasteven Miller did latch well on her own with Nakia Egan in laid back position but Nakia Egan was not comfortable and requested going back to cross cradle  Handouts:   Storing human milk, Paced bottle feeding, Hands on pumping, Hand expression and Latch check list, preparing and storing formula    Education:  Reviewed Latch: Demonstrated how to gently compress the breast and align the baby so that his nose is just above the nipple with his lower lip and chin touching the breast to encourage the deepest, widest, off-center latch  Reviewed Positioning for Dyad: Support baby on pillows or blankets to help relieve mom's discomfort in her wrists  Reviewed Frequency/Supply & Demand: Pumping as often as baby feeds will help establish a healthy milk supply until baby is nursing effectively  Reviewed Infant:Cues and varied States of Awareness  Reviewed Alternative/Artificial Feedings: Paced bottle feeding  Prefer expressed breast milk over formula  Reviewed Mom/Breast care: Moist wound healing for sore nipples  Reviewed Equipment: Medela Pump in Style use and features  Flange size assessed      Plan for breastfeeding    Reassurance and support given     Reviewed early signs of hunger, including tensing of hands and shoulders - no need to wait for open eyes  Offer the breast at early feeding  cues to help make latching easier  Reviewed normal sucking patterns: transition from stimulation to nutritive to release or non-nutritive  Watch for sustained periods of active suckling followed by swallowing  Reviewed normal nursing pattern: infant should nurse for at least 5 minutes or until releases on own  Discussed difference in sensation of non-nutritive v nutritive sucking  Increase frequency of expression  Pump to empty your breasts as often as baby is feeding to help establish a healthy milk supply  Supplementation recommended (document method-education if necessary)  Expressed breast milk or formula as needed via paced bottle feeding  Use of pump demonstrated  The 30mm flanges appear to be a more appropriate fit for you  Breast milk storage discussed: 6-8 hours at room temp, 2-3 months freezer  Do not add fresh milk to frozen   Offer the breast as often as you are comfortable  As Nico Scrivener gets bigger and you feel better, latching should get easier  For a deep effective latch, Compress your breast to make it narrow in the same direction your baby's  mouth is positioned  Move your baby onto your breast so their chin touches first and their head tips back  Your nipple should be at their nose  When they open their mouth wide, move them onto the breast so your nipple enters their mouth pointing upward  Position her close to you supported on pillows or blankets so you don't have to work so hard to hold her up  If the latch hurts, stop and try again  When pumping, Cycle your pump through stimulation and expression mode several times in a session to stimulate several let downs  Use hands on pumping and hand expression to increase your output  Maintain your pump as recommended     To help your nipples heal, in addition to paying close attention to latch, apply protective ointment after feeding or pumping and cover with an occlusive dressing like wax paper  Do this until your nipples have completely healed  Please call with any questions or concerns  I have spent 90 minutes with Patient and family today in which greater than 50% of this time was spent in counseling/coordination of care regarding Intructions for management

## 2018-07-27 LAB — PLACENTA IN STORAGE: NORMAL

## 2018-07-29 NOTE — PROGRESS NOTES
I have reviewed the notes, assessments, and/or procedures performed by Gemini Perez, RN, IBCLC, I concur with her/his documentation of Rajwinder Carrillo

## 2018-08-02 ENCOUNTER — TELEPHONE (OUTPATIENT)
Dept: OBGYN CLINIC | Facility: CLINIC | Age: 33
End: 2018-08-02

## 2018-08-02 ENCOUNTER — OFFICE VISIT (OUTPATIENT)
Dept: OBGYN CLINIC | Facility: CLINIC | Age: 33
End: 2018-08-02
Payer: COMMERCIAL

## 2018-08-02 VITALS — HEIGHT: 58 IN | BODY MASS INDEX: 28.13 KG/M2 | WEIGHT: 134 LBS

## 2018-08-02 DIAGNOSIS — N76.6 VULVAR ULCERATION: Primary | ICD-10-CM

## 2018-08-02 PROBLEM — Z34.83 ENCOUNTER FOR SUPERVISION OF NORMAL PREGNANCY IN MULTIGRAVIDA IN THIRD TRIMESTER: Status: RESOLVED | Noted: 2018-02-15 | Resolved: 2018-08-02

## 2018-08-02 PROBLEM — Z3A.39 39 WEEKS GESTATION OF PREGNANCY: Status: RESOLVED | Noted: 2018-07-19 | Resolved: 2018-08-02

## 2018-08-02 PROCEDURE — 99214 OFFICE O/P EST MOD 30 MIN: CPT | Performed by: PHYSICIAN ASSISTANT

## 2018-08-02 PROCEDURE — 87255 GENET VIRUS ISOLATE HSV: CPT | Performed by: PHYSICIAN ASSISTANT

## 2018-08-02 NOTE — TELEPHONE ENCOUNTER
Called pt - c/o vulvar tenderness & pain -hurts to touch & even if water touches area" denies fever, urinary symptoms  Pt is 2 wks PP vag delivery  Exclusively breast pumping   Routed to [address] (on call provider)

## 2018-08-02 NOTE — ASSESSMENT & PLAN NOTE
HSV cultures done will call if abnormal, low suspect though  rec epsom salt sitz baths  rx lidocaine gel sent via EMR

## 2018-08-02 NOTE — TELEPHONE ENCOUNTER
Per RK recommendation, pt should be seen in office at first available appt or ER  Pt scheduled for 6pm appt today

## 2018-08-02 NOTE — PROGRESS NOTES
Assessment/Plan:  Problem List Items Addressed This Visit     Vulvar ulceration - Primary     HSV cultures done will call if abnormal, low suspect though  rec epsom salt sitz baths  rx lidocaine gel sent via EMR         Relevant Medications    lidocaine (XYLOCAINE) 2 % topical gel    Other Relevant Orders    Herpes simplex virus culture      Subjective:      Patient ID: Suzi Concepcion is a 34 y o  female  Patient presents 2 wks PP from vaginal delivery c/o vulvar pain and burning x 3 days  She states that water or urine touching externally burns and sitting is painful  She denies any vaginal d/c, odor, itching or abdominal pain  Bleeding on and off but not currently bleeding  She denies any fever or chills  Has not used any OTC products and denies any alleviating factors  No sutures from delivery    The following portions of the patient's history were reviewed and updated as appropriate: allergies, current medications, past family history, past medical history, past social history, past surgical history and problem list     Review of Systems   Constitutional: Negative for chills and fever  Gastrointestinal: Negative for abdominal pain  Genitourinary: Positive for genital sores  Negative for dysuria, vaginal bleeding, vaginal discharge and vaginal pain  Objective:  Ht 4' 10" (1 473 m)   Wt 60 8 kg (134 lb)   LMP 10/16/2017   Breastfeeding? Yes   BMI 28 01 kg/m²      Physical Exam   Constitutional: She is oriented to person, place, and time  She appears well-developed and well-nourished  No distress  Abdominal: Soft  She exhibits no distension  There is no tenderness  Genitourinary:       There is tenderness and lesion on the right labia  There is tenderness and lesion on the left labia  No bleeding in the vagina  No vaginal discharge found  Neurological: She is alert and oriented to person, place, and time  Psychiatric: She has a normal mood and affect     Nursing note and vitals reviewed

## 2018-08-02 NOTE — TELEPHONE ENCOUNTER
Lochia had started to subside, but now has returned  Started with pain when anything touches her vulva area (even water) yesterday   Requesting suggestions or medication

## 2018-08-07 LAB — HSV SPEC CULT: ABNORMAL

## 2018-08-08 ENCOUNTER — TELEPHONE (OUTPATIENT)
Dept: OBGYN CLINIC | Facility: CLINIC | Age: 33
End: 2018-08-08

## 2018-08-08 ENCOUNTER — POSTPARTUM VISIT (OUTPATIENT)
Dept: OBGYN CLINIC | Facility: CLINIC | Age: 33
End: 2018-08-08

## 2018-08-08 VITALS — WEIGHT: 126 LBS | SYSTOLIC BLOOD PRESSURE: 100 MMHG | DIASTOLIC BLOOD PRESSURE: 60 MMHG | BODY MASS INDEX: 26.33 KG/M2

## 2018-08-08 DIAGNOSIS — A60.04 HERPES SIMPLEX VULVOVAGINITIS: ICD-10-CM

## 2018-08-08 PROBLEM — O28.1 HIGH RISK PREGNANCY WITH HIGH HCG: Status: RESOLVED | Noted: 2018-03-22 | Resolved: 2018-08-08

## 2018-08-08 PROBLEM — O09.90 HIGH RISK PREGNANCY WITH HIGH HCG: Status: RESOLVED | Noted: 2018-03-22 | Resolved: 2018-08-08

## 2018-08-08 PROCEDURE — 99024 POSTOP FOLLOW-UP VISIT: CPT | Performed by: OBSTETRICS & GYNECOLOGY

## 2018-08-08 NOTE — ASSESSMENT & PLAN NOTE
Reviewed positive culture results with Laura  She was provided with the UpToDate "Beyond [employer]" patient information on genital HSV  We reviewed that this is a sexually transmitted disease, but it is difficult to tell when she would have originally been infected  This was her first recognized outbreak  She has been monogamous with her , and has not been sexually active since her delivery  She is feeling much better, she has been doing sitz baths with good relief  We discussed that she may continue to have outbreaks, and if she has any symptoms she should call for RX of Valtrex to help minimize duration and severity of outbreaks

## 2018-08-08 NOTE — PROGRESS NOTES
Audi Duran  1985    S:  34 y o  female here for postpartum visit  She is s/p Uncomplicated vaginal delivery on 7/19/18   Has been doing sitz baths for her vaginal irritation  Partner had vasectomy - but he has not had follow up semen analysis yet, will be scheduled  Gender: female, "Jessica Alexander"   Apgars: 9/9  Weight: 6lb 6 1oz  Her lochia is light  She is Bottle feeding and Using breast pump without problems  She denies postpartum blues/depression  Her EPDS is 3  Last Pap: 1/15/18 - Normal Cytology, Negative HPV      O:   /60 (BP Location: Left arm, Patient Position: Sitting, Cuff Size: Large)   Wt 57 2 kg (126 lb)   LMP 10/16/2017   BMI 26 33 kg/m²   She appears well and in no distress  Abdomen is soft and nontender  External genitals are normal  Vagina is minimally erythematous, no discrete lesions seen    A/P:  Postpartum visit  She will return in 6 months for her yearly exam, sooner PRN  Problem List Items Addressed This Visit        Genitourinary    Herpes simplex vulvovaginitis     Reviewed positive culture results with Laura  She was provided with the UpToDate "Beyond the Basic" patient information on genital HSV  We reviewed that this is a sexually transmitted disease, but it is difficult to tell when she would have originally been infected  This was her first recognized outbreak  She has been monogamous with her , and has not been sexually active since her delivery  She is feeling much better, she has been doing sitz baths with good relief  We discussed that she may continue to have outbreaks, and if she has any symptoms she should call for RX of Valtrex to help minimize duration and severity of outbreaks              Other Visit Diagnoses     Postpartum examination following vaginal delivery    -  Primary

## 2018-08-10 ENCOUNTER — TELEPHONE (OUTPATIENT)
Dept: OBGYN CLINIC | Facility: CLINIC | Age: 33
End: 2018-08-10

## 2018-08-10 NOTE — TELEPHONE ENCOUNTER
Spoke with pt re pos hsv I result    Pt states ct would take care of initial rx for her if result was pos  Inf pt would call back re rx    roselia anne

## 2018-08-10 NOTE — TELEPHONE ENCOUNTER
We can check HSV-1/HSV-2 labs if she wants - culture does not differentiate  She is almost completely healed, so does not need meds at this time  If she has any further outbreaks or issues needs to call for rx

## 2018-08-10 NOTE — TELEPHONE ENCOUNTER
----- Message from Gricelda Bacon sent at 8/10/2018 11:10 AM EDT -----  Regarding: Test Results Question  Contact: [phone]  I have a question about HERPES SIMPLEX VIRUS CULTURE resulted on 8/7/18, 1:47 PM     Does it indicate whether it's HSV1 or HSV2 virus?     Thank you,  Marbella Pham

## 2018-08-10 NOTE — TELEPHONE ENCOUNTER
Dr Charbel Mercado    When I informed pt of result, I thought it was just hsvI    After speaking to pt, she assumed it was just oral herpes    She is questioning if she will be charged for further testing    Seems anxious now    Sorry  req further instruction     I am at ext [phone] if you want to talk to me   thanks

## 2018-08-17 NOTE — TELEPHONE ENCOUNTER
She does not need further testing, her diagnosis was based on a genital culture    So regardless of whether HSV 1 or 2, her outbreak was genital

## 2018-11-28 ENCOUNTER — ANNUAL EXAM (OUTPATIENT)
Dept: OBGYN CLINIC | Facility: CLINIC | Age: 33
End: 2018-11-28
Payer: COMMERCIAL

## 2018-11-28 VITALS
BODY MASS INDEX: 25.89 KG/M2 | WEIGHT: 120 LBS | DIASTOLIC BLOOD PRESSURE: 50 MMHG | SYSTOLIC BLOOD PRESSURE: 110 MMHG | HEIGHT: 57 IN

## 2018-11-28 DIAGNOSIS — Z01.419 ENCOUNTER FOR GYNECOLOGICAL EXAMINATION WITHOUT ABNORMAL FINDING: Primary | ICD-10-CM

## 2018-11-28 PROCEDURE — S0612 ANNUAL GYNECOLOGICAL EXAMINA: HCPCS | Performed by: OBSTETRICS & GYNECOLOGY

## 2018-11-28 NOTE — PROGRESS NOTES
Assessment/Plan:    Encounter for gynecological examination without abnormal finding    Pap up to date  Mammogram age 35  Encouraged condom use until  has confirmatory SA  Subjective:      Patient ID: Jennifer Kerns is a 28 y o  female  Yearly  Grav-3 Para-3  Forcept x 1,  x 2  LMP- amenorrhea since delivery  Breastfeeding  BC- condoms/  vasectomy and needs confirmation testing  Pap- 1/15/2018-negative -HPV    Pansy Second is doing well  No further outbreaks of herpes - aware can call with outbreak for meds  No menses yet, is still pumping for her daughter  No pelvic pain, discharge, dyspareunia  No urinary concerns, had some JENNYFER prior to delivery, discussed kegel exercises  No bowel concerns  Gynecologic Exam   The patient's pertinent negatives include no pelvic pain or vaginal discharge  Pertinent negatives include no constipation or diarrhea  The following portions of the patient's history were reviewed and updated as appropriate: allergies, current medications, past family history, past medical history, past social history, past surgical history and problem list     Review of Systems   Gastrointestinal: Negative for constipation and diarrhea  Genitourinary: Negative for dyspareunia, menstrual problem, pelvic pain, vaginal bleeding, vaginal discharge and vaginal pain  All other systems reviewed and are negative  Objective:      /50 (BP Location: Left arm, Patient Position: Sitting, Cuff Size: Standard)   Ht 4' 9" (1 448 m)   Wt 54 4 kg (120 lb)   LMP  (LMP Unknown)   Breastfeeding? Yes   BMI 25 97 kg/m²          Physical Exam   Constitutional: She is oriented to person, place, and time  She appears well-developed and well-nourished  Pulmonary/Chest: Right breast exhibits no inverted nipple, no mass, no nipple discharge, no skin change and no tenderness   Left breast exhibits no inverted nipple, no mass, no nipple discharge, no skin change and no tenderness  Abdominal: Soft  There is no tenderness  Genitourinary: Vagina normal and uterus normal  There is no rash or lesion on the right labia  There is no rash or lesion on the left labia  Uterus is not enlarged and not tender  Cervix exhibits no motion tenderness  Right adnexum displays no mass and no tenderness  Left adnexum displays no mass and no tenderness  No vaginal discharge found  Neurological: She is alert and oriented to person, place, and time  Skin: Skin is warm and dry  Psychiatric: She has a normal mood and affect  Vitals reviewed

## 2019-05-15 ENCOUNTER — OFFICE VISIT (OUTPATIENT)
Dept: URGENT CARE | Facility: CLINIC | Age: 34
End: 2019-05-15
Payer: COMMERCIAL

## 2019-05-15 VITALS
DIASTOLIC BLOOD PRESSURE: 58 MMHG | HEIGHT: 58 IN | SYSTOLIC BLOOD PRESSURE: 119 MMHG | WEIGHT: 120.6 LBS | TEMPERATURE: 98.9 F | RESPIRATION RATE: 16 BRPM | BODY MASS INDEX: 25.31 KG/M2 | OXYGEN SATURATION: 98 % | HEART RATE: 95 BPM

## 2019-05-15 DIAGNOSIS — J02.9 SORE THROAT: Primary | ICD-10-CM

## 2019-05-15 DIAGNOSIS — J30.2 SEASONAL ALLERGIC RHINITIS, UNSPECIFIED TRIGGER: ICD-10-CM

## 2019-05-15 LAB — S PYO AG THROAT QL: NEGATIVE

## 2019-05-15 PROCEDURE — 87430 STREP A AG IA: CPT | Performed by: NURSE PRACTITIONER

## 2019-05-15 PROCEDURE — 99213 OFFICE O/P EST LOW 20 MIN: CPT | Performed by: NURSE PRACTITIONER

## 2019-05-15 PROCEDURE — 87070 CULTURE OTHR SPECIMN AEROBIC: CPT | Performed by: NURSE PRACTITIONER

## 2019-05-17 LAB — BACTERIA THROAT CULT: NORMAL

## 2019-05-31 ENCOUNTER — OFFICE VISIT (OUTPATIENT)
Dept: URGENT CARE | Facility: CLINIC | Age: 34
End: 2019-05-31
Payer: COMMERCIAL

## 2019-05-31 VITALS
HEART RATE: 95 BPM | RESPIRATION RATE: 20 BRPM | SYSTOLIC BLOOD PRESSURE: 114 MMHG | HEIGHT: 58 IN | DIASTOLIC BLOOD PRESSURE: 67 MMHG | OXYGEN SATURATION: 99 % | BODY MASS INDEX: 25.36 KG/M2 | TEMPERATURE: 99.7 F | WEIGHT: 120.8 LBS

## 2019-05-31 DIAGNOSIS — J01.00 ACUTE NON-RECURRENT MAXILLARY SINUSITIS: Primary | ICD-10-CM

## 2019-05-31 PROCEDURE — 99213 OFFICE O/P EST LOW 20 MIN: CPT | Performed by: NURSE PRACTITIONER

## 2019-05-31 RX ORDER — AMOXICILLIN AND CLAVULANATE POTASSIUM 875; 125 MG/1; MG/1
1 TABLET, FILM COATED ORAL EVERY 12 HOURS SCHEDULED
Qty: 14 TABLET | Refills: 0 | Status: SHIPPED | OUTPATIENT
Start: 2019-05-31 | End: 2019-06-07

## 2019-09-03 ENCOUNTER — OFFICE VISIT (OUTPATIENT)
Dept: FAMILY MEDICINE CLINIC | Facility: CLINIC | Age: 34
End: 2019-09-03
Payer: COMMERCIAL

## 2019-09-03 VITALS
HEIGHT: 58 IN | DIASTOLIC BLOOD PRESSURE: 72 MMHG | HEART RATE: 78 BPM | OXYGEN SATURATION: 98 % | TEMPERATURE: 98.5 F | BODY MASS INDEX: 25.48 KG/M2 | WEIGHT: 121.4 LBS | SYSTOLIC BLOOD PRESSURE: 112 MMHG | RESPIRATION RATE: 14 BRPM

## 2019-09-03 DIAGNOSIS — Z13.6 SCREENING FOR CARDIOVASCULAR CONDITION: ICD-10-CM

## 2019-09-03 DIAGNOSIS — G89.29 WRIST PAIN, CHRONIC, LEFT: ICD-10-CM

## 2019-09-03 DIAGNOSIS — M25.532 WRIST PAIN, CHRONIC, LEFT: ICD-10-CM

## 2019-09-03 DIAGNOSIS — Z00.00 WELL ADULT EXAM: Primary | ICD-10-CM

## 2019-09-03 DIAGNOSIS — N39.3 STRESS INCONTINENCE: ICD-10-CM

## 2019-09-03 DIAGNOSIS — L50.9 HIVES: ICD-10-CM

## 2019-09-03 PROCEDURE — 99385 PREV VISIT NEW AGE 18-39: CPT | Performed by: FAMILY MEDICINE

## 2019-09-03 NOTE — PROGRESS NOTES
Chief Complaint   Patient presents with   [facility] Patient     Health Maintenance   Topic Date Due    INFLUENZA VACCINE  09/30/2019 (Originally 7/1/2019)    Depression Screening PHQ  09/03/2020    BMI: Followup Plan  09/03/2020    BMI: Adult  09/03/2020    PAP SMEAR  01/15/2021    DTaP,Tdap,and Td Vaccines (5 - Td) 05/02/2028    Pneumococcal Vaccine: 65+ Years (1 of 2 - PCV13) 10/06/2049    HEPATITIS B VACCINES  Completed    Pneumococcal Vaccine: Pediatrics (0 to 5 Years) and At-Risk Patients (6 to 59 Years)  Aged Out     BMI Counseling: Body mass index is 25 37 kg/m²  Discussed the patient's BMI with her  The BMI is above average  BMI counseling and education was provided to the patient  Nutrition recommendations include reducing portion sizes, decreasing overall calorie intake, 3-5 servings of fruits/vegetables daily, reducing fast food intake, consuming healthier snacks, decreasing soda and/or juice intake, moderation in carbohydrate intake, increasing intake of lean protein, reducing intake of saturated fat and trans fat and reducing intake of cholesterol  Exercise recommendations include moderate aerobic physical activity for 150 minutes/week  Assessment/Plan:    Well adult exam  Recommended to follow a well-balanced diet, regular exercise  Follow up with gynecologist for routine pelvic exam and Pap smear  Check lipid panel  Recommended annual Flu vaccination  Wrist pain, chronic, left  Check x-ray of the left wrist     Recommended evaluation by orthopedic surgeon Dr Laron Priest  Stress incontinence  Discussed Kegel exercises with patient  Consider urogynecology evaluation if symptoms persist or worsen  Hives  Recommended to avoid it nuts, seafood  If develops hives again will refer to allergist for further evaluation  Check labs  Schedule physical exam in 1 year  Call office with any acute problems       Diagnoses and all orders for this visit:    Well adult exam    Wrist pain, chronic, left  -     XR wrist 3+ vw left; Future  -     Ambulatory referral to Orthopedic Surgery; Future    Stress incontinence    Hives  -     CBC and differential; Future  -     Comprehensive metabolic panel; Future  -     TSH, 3rd generation with Free T4 reflex; Future    Screening for cardiovascular condition  -     Lipid panel; Future          Subjective:      Patient ID: Spike Camp is a 28 y o  female  HPI     New patient presents to establish medical care, physical exam       Patient has 3 children, youngest daughter is 15month-old  Patient denies history of gestational diabetes, anemia during pregnancy  C/o stress incontinence  No history of recurrent UTI's  Patient c/o left wrist pain with some bone prominence for the past year  Denies H/o left wrist / hand injury  Patient has dry skin, H/o eczema  Using Aveeno lotion  Patient reports developing hives on abdomen, legs 2 weeks ago while on vacation in [place]  She attributed her rash to using a different body wash  She developed hives again over the past weekend  Rash resolved after taking Benadryl  Patient denies history of asthma, food allergies  Denies tobacco, alcohol, drug use  Pelvic exam done by gynecologist in November 2018  Patient had negative Pap smear in January 2018  The following portions of the patient's history were reviewed and updated as appropriate: allergies, current medications, past medical history, past social history, past surgical history and problem list     Review of Systems   Constitutional: Negative for activity change, appetite change, chills, fatigue and fever  HENT: Negative for congestion, dental problem, ear pain, hearing loss, sore throat, tinnitus and trouble swallowing  Eyes: Negative for pain, discharge, redness, itching and visual disturbance  Respiratory: Negative for cough, chest tightness, shortness of breath and wheezing  Cardiovascular: Negative for chest pain, palpitations and leg swelling  Gastrointestinal: Negative for abdominal pain, blood in stool, constipation, diarrhea, nausea and vomiting  Genitourinary: Negative for difficulty urinating, dysuria, flank pain, frequency, hematuria and pelvic pain  Stress incontinence   Musculoskeletal: Positive for arthralgias (left wrist pain )  Negative for back pain, gait problem, joint swelling, myalgias and neck pain  Skin: Positive for rash  Negative for wound  Neurological: Negative for dizziness, seizures, syncope and headaches  Hematological: Negative  Psychiatric/Behavioral: Negative  Objective:      /72 (BP Location: Left arm, Patient Position: Sitting, Cuff Size: Adult)   Pulse 78   Temp 98 5 °F (36 9 °C) (Tympanic)   Resp 14   Ht 4' 10" (1 473 m)   Wt 55 1 kg (121 lb 6 4 oz)   SpO2 98%   BMI 25 37 kg/m²          Physical Exam   Constitutional: She is oriented to person, place, and time  She appears well-developed and well-nourished  HENT:   Head: Normocephalic and atraumatic  Right Ear: External ear normal    Left Ear: External ear normal    Nose: Nose normal    Mouth/Throat: Oropharynx is clear and moist    Eyes: Pupils are equal, round, and reactive to light  Conjunctivae are normal    Neck: Normal range of motion  Neck supple  No thyromegaly present  Cardiovascular: Normal rate, regular rhythm and normal heart sounds  No murmur heard  No BL LE edema   Pulmonary/Chest: Effort normal and breath sounds normal    Abdominal: Soft  Bowel sounds are normal  There is no tenderness  Musculoskeletal:   Left wrist: mild tenderness over radial aspect with some bone prominence  FROM  Lymphadenopathy:     She has no cervical adenopathy  Neurological: She is alert and oriented to person, place, and time  No cranial nerve deficit  Coordination normal    Skin: Skin is warm and dry  No rash noted     Psychiatric: She has a normal mood and affect  Nursing note and vitals reviewed

## 2019-09-04 NOTE — ASSESSMENT & PLAN NOTE
Discussed Kegel exercises with patient  Consider urogynecology evaluation if symptoms persist or worsen

## 2019-09-04 NOTE — ASSESSMENT & PLAN NOTE
Recommended to avoid it nuts, seafood  If develops hives again will refer to allergist for further evaluation

## 2019-09-04 NOTE — ASSESSMENT & PLAN NOTE
Recommended to follow a well-balanced diet, regular exercise  Follow up with gynecologist for routine pelvic exam and Pap smear  Check lipid panel  Recommended annual Flu vaccination

## 2019-10-14 ENCOUNTER — OFFICE VISIT (OUTPATIENT)
Dept: OBGYN CLINIC | Facility: OTHER | Age: 34
End: 2019-10-14
Payer: COMMERCIAL

## 2019-10-14 VITALS
WEIGHT: 123 LBS | HEART RATE: 73 BPM | BODY MASS INDEX: 25.82 KG/M2 | SYSTOLIC BLOOD PRESSURE: 112 MMHG | DIASTOLIC BLOOD PRESSURE: 68 MMHG | HEIGHT: 58 IN

## 2019-10-14 DIAGNOSIS — M25.532 WRIST PAIN, CHRONIC, LEFT: ICD-10-CM

## 2019-10-14 DIAGNOSIS — M65.4 TENOSYNOVITIS, DE QUERVAIN: Primary | ICD-10-CM

## 2019-10-14 DIAGNOSIS — G89.29 WRIST PAIN, CHRONIC, LEFT: ICD-10-CM

## 2019-10-14 PROCEDURE — 99243 OFF/OP CNSLTJ NEW/EST LOW 30: CPT | Performed by: ORTHOPAEDIC SURGERY

## 2019-10-14 NOTE — PROGRESS NOTES
Assessment:       1  Tenosynovitis, de Quervain    2  Wrist pain, chronic, left          Plan:        Explained my current clinical findings to Madeyln Lopez today her clinical exam is consistent with bilateral de Quervain tenosynovitis which is symptomatic we worse on the left side  We discussed various management options including thumb spica bracing, hand therapy, tendon sheath cortisone injection and surgical intervention  At this time, she would like to take a conservative approach and management and hence I have suggested here to wear a thumb spica brace at night and will also refer her for a course of hand therapy  I will see her back in about 4 weeks time and if symptoms persist we will consider 1st dorsal compartment tendon sheath cortisone injection of the left wrist             Subjective:     Patient ID: Emiliano Garrison is a 27 y o  female  Chief Complaint:    HPI  Madelyn Lopez is a 16-year-old right-hand-dominant lady who has been referred by her primary care Yumiko Pradhan for evaluation of left wrist pain  Madelyn Lopez mentions that her left wrist is the most symptomatic side and she has sometimes sharp discomfort on the radial aspect of the left wrist that occasionally radiates proximally in the forearm and is made worse with repetitive wrist motion  She has also noticed development of a swelling along the radial aspect of the left wrist over the last several weeks  She denies any associated tingling, numbness of the left hand or weakness of the left hand   Symptoms are of moderate intensity  Denies any history of trauma prior to the onset of symptoms  She also mentions similar symptoms of her right radial wrist but of mild intensity and not associated with any swelling in the area  Again, right radial wrist pain occasionally radiates proximally to the forearm and made worse with repetitive wrist motion  This is also sharp in nature       Social History     Occupational History    Not on file   Tobacco Use    Smoking status: Never Smoker    Smokeless tobacco: Never Used   Substance and Sexual Activity    Alcohol use: No    Drug use: No    Sexual activity: Not Currently     Partners: Male     Birth control/protection: Male Sterilization, Condom Male      Review of Systems   Constitutional: Negative  HENT: Negative  Eyes: Negative  Respiratory: Negative  Cardiovascular: Negative  Gastrointestinal: Negative  Endocrine: Negative  Genitourinary: Negative  Skin: Negative  Allergic/Immunologic: Negative  Neurological: Negative  Hematological: Negative  Psychiatric/Behavioral: Negative  Objective:     Ortho ExamPhysical Exam   Constitutional: She is oriented to person, place, and time  She appears well-developed and well-nourished  HENT:   Head: Normocephalic and atraumatic  Eyes: Pupils are equal, round, and reactive to light  Conjunctivae are normal    Cardiovascular: Normal rate and regular rhythm  Pulmonary/Chest: Effort normal  No respiratory distress  Neurological: She is alert and oriented to person, place, and time  No cranial nerve deficit  Skin: Skin is warm and dry  No erythema  Psychiatric: She has a normal mood and affect  Her behavior is normal  Judgment and thought content normal    Nursing note and vitals reviewed  Left wrist exam:  Well-localized 0 5 x 0 5 cm swelling noted immediately proximal to the radial styloid  There was no overlying erythema or palpable warmth  The swelling is mobile and of soft consistency  No significant fluctuation noted  Decreased mobility with resisted movement of 1st extensor compartment  Equivocal Finkelstein's no tenderness over the anatomical snuffbox or the 1st [facility] joint  No tenderness over the distal radial ulnar joint and full range of left wrist motion without significant discomfort    Further limited clinical ultrasound examination of the clinically observed swelling revealed mild fluid collection in the 1st dorsal compartment tendon sheath with 1st compartment tendon thickening without tear noted at the level of the radial styloid  Right wrist exam:  Tender to palpation over the 1st dorsal compartment  No overlying swelling or erythema  Finkelstein's equivocal   No tenderness of the anatomical snuffbox or the 1st [facility] joint  Clinically intact distal neurovascular status  Mild discomfort with resisted abduction and extension of the thumb

## 2019-10-14 NOTE — PATIENT INSTRUCTIONS
Voncile Marleny Disease   WHAT YOU NEED TO KNOW:   Kingman Bernheim disease is inflammation of the tendons on the thumb side of your wrist  Tendons are thick strands of tissue that connect muscles to bones  DISCHARGE INSTRUCTIONS:   Splint or brace: These devices will help decrease pain, limit movement, and protect your wrist so that it can heal  Make sure your device is comfortable  If it is too tight, your fingers may feel numb or tingly  Do not push or lean on your device because it can break  Physical or occupational therapy:  You may need to see a physical or occupational therapist to teach you special exercises  These exercises help improve movement and decrease pain  They also help improve strength and decrease your risk for loss of function  Therapists will help you make changes to your daily activities to decrease stress and pressure on the tendons  Rest:  Rest your injured thumb or wrist  Avoid twisting, grasping, or gripping movements  Ask when you can return to your normal activities  Medicines:   · NSAIDs:  These medicines decrease swelling, pain, and fever  They are available without a doctor's order  Ask which medicine is right for you, and how much to take  Take as directed  NSAIDs can cause stomach bleeding or kidney problems if not taken correctly  · Take your medicine as directed  Contact your healthcare provider if you think your medicine is not helping or if you have side effects  Tell him of her if you are allergic to any medicine  Keep a list of the medicines, vitamins, and herbs you take  Include the amounts, and when and why you take them  Bring the list or the pill bottles to follow-up visits  Carry your medicine list with you in case of an emergency  Follow up with your healthcare provider as directed:  Write down your questions so you remember to ask them during your visits  Contact your healthcare provider if:   · Your splint or brace is too tight and you cannot loosen it      · You have a fever  · Your pain and swelling get worse or do not go away  · Your cannot grasp objects because of the pain and swelling  · You have questions or concerns about your condition or care  Return to the emergency department if:   · You cannot move your thumb or wrist     · Your fingers feel numb, tingly, cool to the touch, or look blue or pale  © 2017 [facility] Information is for End User's use only and may not be sold, redistributed or otherwise used for commercial purposes  All illustrations and images included in CareNotes® are the copyrighted property of [employer] , [employer]  or Amish Monteiro  The above information is an  only  It is not intended as medical advice for individual conditions or treatments  Talk to your doctor, nurse or pharmacist before following any medical regimen to see if it is safe and effective for you

## 2019-10-14 NOTE — LETTER
October 16, 2019     Cammie Pradhan, [address]    Patient: Emiliano Garrison   YOB: 1985   Date of Visit: 10/14/2019       Dear Dr Dallas Aldana:    Thank you for referring Emile Anderson to me for evaluation  Below are my notes for this consultation  If you have questions, please do not hesitate to call me  I look forward to following your patient along with you  Sincerely,        Krista Toledo MD        CC: No Recipients  Krista Toledo MD  10/16/2019  1:10 PM  Sign at close encounter  Assessment:       1  Tenosynovitis, de Quervain    2  Wrist pain, chronic, left          Plan:        Explained my current clinical findings to Madelyn Lopez today her clinical exam is consistent with bilateral de Quervain tenosynovitis which is symptomatic we worse on the left side  We discussed various management options including thumb spica bracing, hand therapy, tendon sheath cortisone injection and surgical intervention  At this time, she would like to take a conservative approach and management and hence I have suggested here to wear a thumb spica brace at night and will also refer her for a course of hand therapy  I will see her back in about 4 weeks time and if symptoms persist we will consider 1st dorsal compartment tendon sheath cortisone injection of the left wrist             Subjective:     Patient ID: Emiliano Garrison is a 27 y o  female  Chief Complaint:    HPI  Madelyn Lopez is a 39-year-old right-hand-dominant lady who has been referred by her primary care Yumiko Pradhan for evaluation of left wrist pain  Madelyn Lopez mentions that her left wrist is the most symptomatic side and she has sometimes sharp discomfort on the radial aspect of the left wrist that occasionally radiates proximally in the forearm and is made worse with repetitive wrist motion    She has also noticed development of a swelling along the radial aspect of the left wrist over the last several weeks  She denies any associated tingling, numbness of the left hand or weakness of the left hand   Symptoms are of moderate intensity  Denies any history of trauma prior to the onset of symptoms  She also mentions similar symptoms of her right radial wrist but of mild intensity and not associated with any swelling in the area  Again, right radial wrist pain occasionally radiates proximally to the forearm and made worse with repetitive wrist motion  This is also sharp in nature  Social History     Occupational History    Not on file   Tobacco Use    Smoking status: Never Smoker    Smokeless tobacco: Never Used   Substance and Sexual Activity    Alcohol use: No    Drug use: No    Sexual activity: Not Currently     Partners: Male     Birth control/protection: Male Sterilization, Condom Male      Review of Systems   Constitutional: Negative  HENT: Negative  Eyes: Negative  Respiratory: Negative  Cardiovascular: Negative  Gastrointestinal: Negative  Endocrine: Negative  Genitourinary: Negative  Skin: Negative  Allergic/Immunologic: Negative  Neurological: Negative  Hematological: Negative  Psychiatric/Behavioral: Negative  Objective:     Ortho ExamPhysical Exam   Constitutional: She is oriented to person, place, and time  She appears well-developed and well-nourished  HENT:   Head: Normocephalic and atraumatic  Eyes: Pupils are equal, round, and reactive to light  Conjunctivae are normal    Cardiovascular: Normal rate and regular rhythm  Pulmonary/Chest: Effort normal  No respiratory distress  Neurological: She is alert and oriented to person, place, and time  No cranial nerve deficit  Skin: Skin is warm and dry  No erythema  Psychiatric: She has a normal mood and affect  Her behavior is normal  Judgment and thought content normal    Nursing note and vitals reviewed          Left wrist exam:  Well-localized 0 5 x 0 5 cm swelling noted immediately proximal to the radial styloid  There was no overlying erythema or palpable warmth  The swelling is mobile and of soft consistency  No significant fluctuation noted  Decreased mobility with resisted movement of 1st extensor compartment  Equivocal Finkelstein's no tenderness over the anatomical snuffbox or the 1st [facility] joint  No tenderness over the distal radial ulnar joint and full range of left wrist motion without significant discomfort  Further limited clinical ultrasound examination of the clinically observed swelling revealed mild fluid collection in the 1st dorsal compartment tendon sheath with 1st compartment tendon thickening without tear noted at the level of the radial styloid  Right wrist exam:  Tender to palpation over the 1st dorsal compartment  No overlying swelling or erythema  Finkelstein's equivocal   No tenderness of the anatomical snuffbox or the 1st CMC joint  Clinically intact distal neurovascular status  Mild discomfort with resisted abduction and extension of the thumb

## 2019-12-11 ENCOUNTER — ANNUAL EXAM (OUTPATIENT)
Dept: OBGYN CLINIC | Facility: CLINIC | Age: 34
End: 2019-12-11
Payer: COMMERCIAL

## 2019-12-11 VITALS
DIASTOLIC BLOOD PRESSURE: 62 MMHG | HEIGHT: 58 IN | BODY MASS INDEX: 26.45 KG/M2 | WEIGHT: 126 LBS | SYSTOLIC BLOOD PRESSURE: 100 MMHG

## 2019-12-11 DIAGNOSIS — Z01.419 ENCOUNTER FOR GYNECOLOGICAL EXAMINATION (GENERAL) (ROUTINE) WITHOUT ABNORMAL FINDINGS: Primary | ICD-10-CM

## 2019-12-11 PROCEDURE — 99395 PREV VISIT EST AGE 18-39: CPT | Performed by: OBSTETRICS & GYNECOLOGY

## 2019-12-11 NOTE — PROGRESS NOTES
Christine Tanner  1985      CC:  Yearly exam    S:  27 y o  female here for yearly exam  Her cycles are regular, not heavy or crampy  They will fluctuate within 5 days every month  She has no intermenstrual bleeding  She has no concerning vaginal discharge  She has no pelvic pain  Sexual activity: She is sexually active without pain, bleeding or dryness  Contraception: She uses vasectomy  for contraception  STD testing:  She does not want STD testing today  Gardasil:  She has not had the Gardasil series, but will consider and check with her insurance  Last Pap 1/15/2018 - Normal Cytology, Negative HPV    We reviewed ASCCP guidelines for Pap testing today  Family hx of breast cancer: no  Family hx of ovarian cancer: no  Family hx of colon cancer:  no    No current outpatient medications on file    Social History     Socioeconomic History    Marital status: /Civil Union     Spouse name: Not on file    Number of children: Not on file    Years of education: Not on file    Highest education level: Not on file   Occupational History    Not on file   Social Needs    Financial resource strain: Not on file    Food insecurity:     Worry: Not on file     Inability: Not on file    Transportation needs:     Medical: Not on file     Non-medical: Not on file   Tobacco Use    Smoking status: Never Smoker    Smokeless tobacco: Never Used   Substance and Sexual Activity    Alcohol use: No    Drug use: No    Sexual activity: Yes     Partners: Male     Birth control/protection: Male Sterilization   Lifestyle    Physical activity:     Days per week: Not on file     Minutes per session: Not on file    Stress: Not on file   Relationships    Social connections:     Talks on phone: Not on file     Gets together: Not on file     Attends [employer]ism service: Not on file     Active member of club or organization: Not on file     Attends meetings of clubs or organizations: Not on file Relationship status: Not on file    Intimate partner violence:     Fear of current or ex partner: Not on file     Emotionally abused: Not on file     Physically abused: Not on file     Forced sexual activity: Not on file   Other Topics Concern    Not on file   Social History Narrative    Denied: history of coffee    Lack of exercise    Uses safety equipment - seatbelts     Family History   Problem Relation Age of Onset    Insomnia Mother     Osteoporosis Mother     Heart disease Maternal Grandmother     Heart disease Maternal Grandfather     Hypertension Maternal Uncle       Past Medical History:   Diagnosis Date    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     Eczema     Last Assessed: 7/25/2013    Forceps delivery     2009 son    Latent tuberculosis     crx 3/10/2011 neg chest; pt on isoniazid 3/11/11 for 9 mths; quanteferon blood test was neg on 8/20/13    Normal delivery     2011 son    Stress incontinence     Varicella     childhood        Review of Systems   Respiratory: Negative  Cardiovascular: Negative  Gastrointestinal: Negative for constipation and diarrhea  Genitourinary: Negative for difficulty urinating, pelvic pain, vaginal bleeding, vaginal discharge, itching or odor  O:  Blood pressure 100/62, height 4' 10" (1 473 m), weight 57 2 kg (126 lb), last menstrual period 11/16/2019, currently breastfeeding  Patient appears well and is not in distress  Neck is supple without masses  Breasts are symmetrical without mass, tenderness, nipple discharge, skin changes or adenopathy  Abdomen is soft and nontender without masses  External genitals are normal without lesions or rashes  Urethral meatus and urethra are normal  Bladder is normal to palpation  Vagina is normal without discharge or bleeding  Cervix is normal without discharge or lesion  Uterus is normal, mobile, nontender without palpable mass  Adnexa are normal, nontender, without palpable mass       A:  Yearly exam      P:   Pap up to date   Call if desires gardasil  RTO one year for yearly exam or sooner as needed

## 2020-08-19 ENCOUNTER — TELEPHONE (OUTPATIENT)
Dept: FAMILY MEDICINE CLINIC | Facility: CLINIC | Age: 35
End: 2020-08-19

## 2020-10-05 ENCOUNTER — OFFICE VISIT (OUTPATIENT)
Dept: FAMILY MEDICINE CLINIC | Facility: CLINIC | Age: 35
End: 2020-10-05
Payer: COMMERCIAL

## 2020-10-05 VITALS
SYSTOLIC BLOOD PRESSURE: 102 MMHG | TEMPERATURE: 98.7 F | HEIGHT: 58 IN | BODY MASS INDEX: 25.23 KG/M2 | WEIGHT: 120.2 LBS | DIASTOLIC BLOOD PRESSURE: 68 MMHG | HEART RATE: 76 BPM | OXYGEN SATURATION: 97 % | RESPIRATION RATE: 14 BRPM

## 2020-10-05 DIAGNOSIS — Z00.00 WELL ADULT EXAM: Primary | ICD-10-CM

## 2020-10-05 DIAGNOSIS — Z23 NEED FOR INFLUENZA VACCINATION: ICD-10-CM

## 2020-10-05 PROCEDURE — 90471 IMMUNIZATION ADMIN: CPT

## 2020-10-05 PROCEDURE — 3725F SCREEN DEPRESSION PERFORMED: CPT | Performed by: FAMILY MEDICINE

## 2020-10-05 PROCEDURE — 90686 IIV4 VACC NO PRSV 0.5 ML IM: CPT

## 2020-10-05 PROCEDURE — 1036F TOBACCO NON-USER: CPT | Performed by: FAMILY MEDICINE

## 2020-10-05 PROCEDURE — 99395 PREV VISIT EST AGE 18-39: CPT | Performed by: FAMILY MEDICINE

## 2021-01-02 ENCOUNTER — OFFICE VISIT (OUTPATIENT)
Dept: URGENT CARE | Facility: CLINIC | Age: 36
End: 2021-01-02
Payer: COMMERCIAL

## 2021-01-02 VITALS
HEART RATE: 73 BPM | TEMPERATURE: 99.2 F | BODY MASS INDEX: 25.82 KG/M2 | SYSTOLIC BLOOD PRESSURE: 123 MMHG | HEIGHT: 58 IN | DIASTOLIC BLOOD PRESSURE: 58 MMHG | WEIGHT: 123 LBS | OXYGEN SATURATION: 96 % | RESPIRATION RATE: 18 BRPM

## 2021-01-02 DIAGNOSIS — L50.9 HIVES: Primary | ICD-10-CM

## 2021-01-02 PROCEDURE — G0382 LEV 3 HOSP TYPE B ED VISIT: HCPCS | Performed by: PREVENTIVE MEDICINE

## 2021-01-02 RX ORDER — METHYLPREDNISOLONE 4 MG/1
TABLET ORAL
Qty: 1 EACH | Refills: 0 | Status: SHIPPED | OUTPATIENT
Start: 2021-01-02 | End: 2021-01-18

## 2021-01-02 NOTE — PATIENT INSTRUCTIONS
When you get home, with a pencil paper going to the bathroom the kitchen and the laundry room and look for new substances or new products  You need to see an allergist as soon as possible    You can try Zyrtec for the itching, if this does not work use Benadryl

## 2021-01-02 NOTE — PROGRESS NOTES
[facility]        NAME: Garcia Hawley is a 38 y o  female  : 1985    MRN: [id number]  DATE: 2021  TIME: 1:05 PM    Assessment and Plan   Hives [L50 9]  1  Hives  methylPREDNISolone 4 MG tablet therapy pack         Patient Instructions       Follow up with PCP in 3-5 days  Proceed to  ER if symptoms worsen  Chief Complaint     Chief Complaint   Patient presents with    Urticaria     Pt broke out in hives on on  and since has had issues with keeping the inflammation of hives and tiching down  Pt states she did eat mixed nuts recently with cashews, peanuts, and almonds with seasoning  Pt states she took one benadryl and calamine lotion and neither gav her relief  History of Present Illness       History of allergy and allergic rashes  Recently she has broken out with a rash covering most of her trunk, it is reddened raised and itchy  Review of Systems   Review of Systems   Skin: Positive for rash           Current Medications       Current Outpatient Medications:     methylPREDNISolone 4 MG tablet therapy pack, Use as directed on package, Disp: 1 each, Rfl: 0    Current Allergies     Allergies as of 2021    (No Known Allergies)            The following portions of the patient's history were reviewed and updated as appropriate: allergies, current medications, past family history, past medical history, past social history, past surgical history and problem list      Past Medical History:   Diagnosis Date    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     Eczema     Last Assessed: 2013    Forceps delivery     2009 son    Latent tuberculosis     crx 3/10/2011 neg chest; pt on isoniazid 3/11/11 for 9 mths; quanteferon blood test was neg on 13    Normal delivery      son    Stress incontinence     Varicella     childhood       Past Surgical History:   Procedure Laterality Date    WISDOM TOOTH EXTRACTION         Family History   Problem Relation Age of Onset    Insomnia Mother     Osteoporosis Mother     Heart disease Maternal Grandmother     Heart disease Maternal Grandfather     Hypertension Maternal Uncle          Medications have been verified  Objective   /58   Pulse 73   Temp 99 2 °F (37 3 °C) (Tympanic)   Resp 18   Ht 4' 10" (1 473 m)   Wt 55 8 kg (123 lb)   LMP 12/28/2020 (Exact Date)   SpO2 96%   Breastfeeding No   BMI 25 71 kg/m²   Patient's last menstrual period was 12/28/2020 (exact date)  Physical Exam     Physical Exam  Skin:     Comments: Erythematous raised rash covering most of the trunk with demarcated edges

## 2021-01-18 ENCOUNTER — ANNUAL EXAM (OUTPATIENT)
Dept: OBGYN CLINIC | Facility: CLINIC | Age: 36
End: 2021-01-18
Payer: COMMERCIAL

## 2021-01-18 VITALS
SYSTOLIC BLOOD PRESSURE: 120 MMHG | HEIGHT: 58 IN | DIASTOLIC BLOOD PRESSURE: 72 MMHG | BODY MASS INDEX: 25.82 KG/M2 | WEIGHT: 123 LBS

## 2021-01-18 DIAGNOSIS — Z01.419 ENCNTR FOR GYN EXAM (GENERAL) (ROUTINE) W/O ABN FINDINGS: Primary | ICD-10-CM

## 2021-01-18 DIAGNOSIS — Z23 NEED FOR HPV VACCINATION: ICD-10-CM

## 2021-01-18 DIAGNOSIS — Z71.85 HPV VACCINE COUNSELING: ICD-10-CM

## 2021-01-18 PROCEDURE — 87624 HPV HI-RISK TYP POOLED RSLT: CPT | Performed by: NURSE PRACTITIONER

## 2021-01-18 PROCEDURE — 90471 IMMUNIZATION ADMIN: CPT

## 2021-01-18 PROCEDURE — 99395 PREV VISIT EST AGE 18-39: CPT | Performed by: NURSE PRACTITIONER

## 2021-01-18 PROCEDURE — 90651 9VHPV VACCINE 2/3 DOSE IM: CPT

## 2021-01-18 PROCEDURE — 1036F TOBACCO NON-USER: CPT | Performed by: NURSE PRACTITIONER

## 2021-01-18 PROCEDURE — G0145 SCR C/V CYTO,THINLAYER,RESCR: HCPCS | Performed by: NURSE PRACTITIONER

## 2021-01-18 PROCEDURE — 3008F BODY MASS INDEX DOCD: CPT | Performed by: NURSE PRACTITIONER

## 2021-01-18 NOTE — ASSESSMENT & PLAN NOTE
Gardasil 9 was requested today for prevention of HPV-related disease  We discussed risks/benefits, SE's/AE's at length and all questions were answered  She was encouraged to contact her insurance co to confirm coverage of the vaccine but declines and elects to proceed with the series regardless of out of pocket cost (counseled on ~$330 per dose last I checked)  Dose #1 was administered and tolerated well and she will schedule next dose

## 2021-01-18 NOTE — PROGRESS NOTES
Assessment/Plan:    Encntr for gyn exam (general) (routine) w/o abn findings  Normal findings on routine gyn exam  Advised monthly SBE, annual CBE and baseline screening mammo at age 35  Reviewed ASCCP guidelines and recommended pap with cotesting q3 yrs for this low risk patient; this was collected today  STI testing was offered and the patient declines; she reports low risk  The patient reports vas for contra  Diet/activity recommendations:  Encouraged daily Ca++ and vitamin D intake as well as daily weight bearing exercise for promotion of bone health  RTO in one year or sooner PRN  HPV vaccine counseling  Gardasil 9 was requested today for prevention of HPV-related disease  We discussed risks/benefits, SE's/AE's at length and all questions were answered  She was encouraged to contact her insurance co to confirm coverage of the vaccine but declines and elects to proceed with the series regardless of out of pocket cost (counseled on ~$330 per dose last I checked)  Dose #1 was administered and tolerated well and she will schedule next dose  Diagnoses and all orders for this visit:    Encntr for gyn exam (general) (routine) w/o abn findings  -     Liquid-based pap, screening    Need for HPV vaccination  -     HPV Vaccine 9 valent IM    HPV vaccine counseling          Subjective:      Patient ID: Param Macias is a 38 y o  female  This patient presents for routine annual gyn exam    Medically stable  Schedule to see PCP re: recent urticaria of unknown etiology  She denies acute gyn complaints  Menses are regular and light to mod  She denies pelvic pain, breast concerns, abnormal discharge, bowel/bladder dysfunction, depression/anxiety   and monogamous  She denies STI concerns  Vas for contraception  She requests gardasil today   Discussed this with Maryan at last year's annual gyn exam    Kids are well - 3, 7 and 11       The following portions of the patient's history were reviewed and updated as appropriate: allergies, current medications, past family history, past medical history, past social history, past surgical history and problem list     Review of Systems   Constitutional: Negative  Respiratory: Negative  Cardiovascular: Negative  Gastrointestinal: Negative  Genitourinary: Negative  Musculoskeletal: Negative  Skin: Negative  Neurological: Negative  Psychiatric/Behavioral: Negative  Objective:      /72 (BP Location: Right arm, Patient Position: Sitting, Cuff Size: Standard)   Ht 4' 9 87" (1 47 m)   Wt 55 8 kg (123 lb)   LMP 12/28/2020 (Exact Date)   BMI 25 82 kg/m²          Physical Exam  Constitutional:       Appearance: She is well-developed  HENT:      Head: Normocephalic and atraumatic  Eyes:      Pupils: Pupils are equal, round, and reactive to light  Neck:      Musculoskeletal: Normal range of motion and neck supple  Thyroid: No thyromegaly  Cardiovascular:      Rate and Rhythm: Normal rate and regular rhythm  Heart sounds: Normal heart sounds  Pulmonary:      Effort: Pulmonary effort is normal  No respiratory distress  Breath sounds: Normal breath sounds  No wheezing or rales  Chest:      Chest wall: No mass, deformity or tenderness  Breasts: Breasts are symmetrical          Right: No inverted nipple, mass, nipple discharge, skin change or tenderness  Left: No inverted nipple, mass, nipple discharge, skin change or tenderness  Abdominal:      General: There is no distension  Palpations: Abdomen is soft  There is no hepatomegaly, splenomegaly or mass  Tenderness: There is no abdominal tenderness  There is no guarding or rebound  Hernia: There is no hernia in the left inguinal area or right inguinal area  Genitourinary:     Labia:         Right: No rash, tenderness, lesion or injury  Left: No rash, tenderness, lesion or injury  Vagina: Normal  No foreign body   No vaginal discharge, erythema, tenderness or bleeding  Cervix: No cervical motion tenderness, discharge or friability  Uterus: Normal        Adnexa:         Right: No mass, tenderness or fullness  Left: No mass, tenderness or fullness  Rectum: Normal    Musculoskeletal: Normal range of motion  Lymphadenopathy:      Cervical: No cervical adenopathy  Skin:     General: Skin is warm and dry  Findings: No rash  Nails: There is no clubbing  Neurological:      Mental Status: She is alert and oriented to person, place, and time  Cranial Nerves: No cranial nerve deficit  Psychiatric:         Speech: Speech normal          Behavior: Behavior normal          Thought Content:  Thought content normal          Judgment: Judgment normal

## 2021-01-18 NOTE — ASSESSMENT & PLAN NOTE
Normal findings on routine gyn exam  Advised monthly SBE, annual CBE and baseline screening mammo at age 35  Reviewed ASCCP guidelines and recommended pap with cotesting q3 yrs for this low risk patient; this was collected today  STI testing was offered and the patient declines; she reports low risk  The patient reports vas for contra  Diet/activity recommendations:  Encouraged daily Ca++ and vitamin D intake as well as daily weight bearing exercise for promotion of bone health  RTO in one year or sooner PRN

## 2021-01-20 LAB
HPV HR 12 DNA CVX QL NAA+PROBE: NEGATIVE
HPV16 DNA CVX QL NAA+PROBE: NEGATIVE
HPV18 DNA CVX QL NAA+PROBE: NEGATIVE

## 2021-01-21 LAB
LAB AP GYN PRIMARY INTERPRETATION: NORMAL
Lab: NORMAL

## 2021-03-02 ENCOUNTER — TELEPHONE (OUTPATIENT)
Dept: OBGYN CLINIC | Facility: CLINIC | Age: 36
End: 2021-03-02

## 2021-03-02 NOTE — TELEPHONE ENCOUNTER
Patient called and had a question about the Gardasil vaccine? She wanted why they recommend this shot if she is a monogamous relastionship  She would like someone to call her back

## 2021-03-26 ENCOUNTER — CLINICAL SUPPORT (OUTPATIENT)
Dept: OBGYN CLINIC | Facility: CLINIC | Age: 36
End: 2021-03-26
Payer: COMMERCIAL

## 2021-03-26 VITALS — BODY MASS INDEX: 26.28 KG/M2 | SYSTOLIC BLOOD PRESSURE: 98 MMHG | WEIGHT: 125.2 LBS | DIASTOLIC BLOOD PRESSURE: 76 MMHG

## 2021-03-26 DIAGNOSIS — Z23 NEED FOR HPV VACCINE: Primary | ICD-10-CM

## 2021-03-26 DIAGNOSIS — Z23 NEED FOR HPV VACCINATION: ICD-10-CM

## 2021-03-26 PROCEDURE — 90471 IMMUNIZATION ADMIN: CPT | Performed by: OBSTETRICS & GYNECOLOGY

## 2021-03-26 PROCEDURE — 90651 9VHPV VACCINE 2/3 DOSE IM: CPT | Performed by: OBSTETRICS & GYNECOLOGY

## 2021-03-26 NOTE — PROGRESS NOTES
Gardasil  Injection # in series: 2  Last dose Given: 1/18/21  Literature Given: Yes first appt  Pt  tolerate well?: Yes  Inj  Site today?: Left Deltoid  1st inj , did patient wait 10-15mins?: N/A  Next Gardasil due: 4mo   From today 7/26  Given by:Erma BACH MA  AND#:[id number]  Exp:[id number]  EZW:[id number]  Manufacture: [employer]

## 2021-04-01 DIAGNOSIS — Z23 ENCOUNTER FOR IMMUNIZATION: ICD-10-CM

## 2021-04-10 ENCOUNTER — IMMUNIZATIONS (OUTPATIENT)
Dept: FAMILY MEDICINE CLINIC | Facility: HOSPITAL | Age: 36
End: 2021-04-10

## 2021-04-10 DIAGNOSIS — Z23 ENCOUNTER FOR IMMUNIZATION: Primary | ICD-10-CM

## 2021-04-10 PROCEDURE — 0001A SARS-COV-2 / COVID-19 MRNA VACCINE (PFIZER-BIONTECH) 30 MCG: CPT

## 2021-04-10 PROCEDURE — 91300 SARS-COV-2 / COVID-19 MRNA VACCINE (PFIZER-BIONTECH) 30 MCG: CPT

## 2021-05-04 ENCOUNTER — IMMUNIZATIONS (OUTPATIENT)
Dept: FAMILY MEDICINE CLINIC | Facility: HOSPITAL | Age: 36
End: 2021-05-04

## 2021-05-04 DIAGNOSIS — Z23 ENCOUNTER FOR IMMUNIZATION: Primary | ICD-10-CM

## 2021-05-04 PROCEDURE — 0002A SARS-COV-2 / COVID-19 MRNA VACCINE (PFIZER-BIONTECH) 30 MCG: CPT

## 2021-05-04 PROCEDURE — 91300 SARS-COV-2 / COVID-19 MRNA VACCINE (PFIZER-BIONTECH) 30 MCG: CPT

## 2021-07-28 ENCOUNTER — CLINICAL SUPPORT (OUTPATIENT)
Dept: OBGYN CLINIC | Facility: CLINIC | Age: 36
End: 2021-07-28

## 2021-07-28 VITALS — DIASTOLIC BLOOD PRESSURE: 62 MMHG | WEIGHT: 121.6 LBS | BODY MASS INDEX: 25.53 KG/M2 | SYSTOLIC BLOOD PRESSURE: 122 MMHG

## 2021-07-28 DIAGNOSIS — Z23 NEED FOR DIPHTHERIA-TETANUS-PERTUSSIS (TDAP) VACCINE: Primary | ICD-10-CM

## 2021-07-28 DIAGNOSIS — T50.901A ACCIDENTAL MEDICATION ERROR, INITIAL ENCOUNTER: ICD-10-CM

## 2021-07-28 NOTE — PROGRESS NOTES
Patient in need of Gardasil # 3 injection  Will need to come back to office for injection  Incorrect injection given to patient of Tdap  Patient last had a Tdap shot in 2018  Patient was notified of error and to schedule her 3rd Gardasil injection at her [facility]  Pt in agreement to rescheduled injection  [facility] management has been notified and patient  Will not be charged for Tdap injection  Injection given of Tdap- Left deltoid  Tolerated well     EXP: 2/19/23  [facility]#[id number]

## 2021-07-29 ENCOUNTER — CLINICAL SUPPORT (OUTPATIENT)
Dept: OBGYN CLINIC | Facility: CLINIC | Age: 36
End: 2021-07-29
Payer: COMMERCIAL

## 2021-07-29 DIAGNOSIS — Z23 NEED FOR HPV VACCINE: Primary | ICD-10-CM

## 2021-07-29 PROCEDURE — 90471 IMMUNIZATION ADMIN: CPT | Performed by: OBSTETRICS & GYNECOLOGY

## 2021-07-29 PROCEDURE — 90651 9VHPV VACCINE 2/3 DOSE IM: CPT | Performed by: OBSTETRICS & GYNECOLOGY

## 2021-07-29 NOTE — PROGRESS NOTES
Patient presents today for her 3rd Gardasil Injection, her last in the series  Gardasil given in Right Deltoid  Patient tolerated well       Lot# [id number]  EXP: 12/9/21

## 2021-08-29 NOTE — ASSESSMENT & PLAN NOTE
Patient doing well  It's a girl!  (Has two boys at home)  28wk lab slip given 
Will have growth scan in 3rd trimester  Normal level II US 
Initial (On Arrival)

## 2021-10-05 ENCOUNTER — OFFICE VISIT (OUTPATIENT)
Dept: FAMILY MEDICINE CLINIC | Facility: CLINIC | Age: 36
End: 2021-10-05
Payer: COMMERCIAL

## 2021-10-05 VITALS
HEIGHT: 58 IN | HEART RATE: 74 BPM | SYSTOLIC BLOOD PRESSURE: 92 MMHG | BODY MASS INDEX: 25.82 KG/M2 | RESPIRATION RATE: 12 BRPM | WEIGHT: 123 LBS | TEMPERATURE: 98.2 F | OXYGEN SATURATION: 98 % | DIASTOLIC BLOOD PRESSURE: 60 MMHG

## 2021-10-05 DIAGNOSIS — Z00.00 WELL ADULT EXAM: Primary | ICD-10-CM

## 2021-10-05 PROCEDURE — 1036F TOBACCO NON-USER: CPT | Performed by: FAMILY MEDICINE

## 2021-10-05 PROCEDURE — 3725F SCREEN DEPRESSION PERFORMED: CPT | Performed by: FAMILY MEDICINE

## 2021-10-05 PROCEDURE — 99395 PREV VISIT EST AGE 18-39: CPT | Performed by: FAMILY MEDICINE

## 2021-10-05 PROCEDURE — 3008F BODY MASS INDEX DOCD: CPT | Performed by: FAMILY MEDICINE

## 2021-10-05 RX ORDER — RIBOFLAVIN (VITAMIN B2) 100 MG
100 TABLET ORAL DAILY
COMMUNITY

## 2021-11-09 ENCOUNTER — TELEPHONE (OUTPATIENT)
Dept: FAMILY MEDICINE CLINIC | Facility: CLINIC | Age: 36
End: 2021-11-09

## 2021-11-09 DIAGNOSIS — Z20.822 EXPOSURE TO COVID-19 VIRUS: Primary | ICD-10-CM

## 2021-11-09 PROCEDURE — U0005 INFEC AGEN DETEC AMPLI PROBE: HCPCS | Performed by: FAMILY MEDICINE

## 2021-11-09 PROCEDURE — U0003 INFECTIOUS AGENT DETECTION BY NUCLEIC ACID (DNA OR RNA); SEVERE ACUTE RESPIRATORY SYNDROME CORONAVIRUS 2 (SARS-COV-2) (CORONAVIRUS DISEASE [COVID-19]), AMPLIFIED PROBE TECHNIQUE, MAKING USE OF HIGH THROUGHPUT TECHNOLOGIES AS DESCRIBED BY CMS-2020-01-R: HCPCS | Performed by: FAMILY MEDICINE

## 2021-11-11 ENCOUNTER — TELEMEDICINE (OUTPATIENT)
Dept: FAMILY MEDICINE CLINIC | Facility: CLINIC | Age: 36
End: 2021-11-11
Payer: COMMERCIAL

## 2021-11-11 VITALS — HEIGHT: 58 IN | BODY MASS INDEX: 25.82 KG/M2 | WEIGHT: 123 LBS

## 2021-11-11 DIAGNOSIS — B34.9 ACUTE VIRAL SYNDROME: Primary | ICD-10-CM

## 2021-11-11 PROCEDURE — 99213 OFFICE O/P EST LOW 20 MIN: CPT | Performed by: FAMILY MEDICINE

## 2021-11-11 PROCEDURE — 1036F TOBACCO NON-USER: CPT | Performed by: FAMILY MEDICINE

## 2021-11-11 PROCEDURE — 3008F BODY MASS INDEX DOCD: CPT | Performed by: FAMILY MEDICINE

## 2021-11-15 ENCOUNTER — TELEPHONE (OUTPATIENT)
Dept: FAMILY MEDICINE CLINIC | Facility: CLINIC | Age: 36
End: 2021-11-15

## 2021-11-15 DIAGNOSIS — B34.9 VIRAL INFECTION, UNSPECIFIED: ICD-10-CM

## 2021-11-15 DIAGNOSIS — Z20.822 EXPOSURE TO COVID-19 VIRUS: ICD-10-CM

## 2021-11-15 PROCEDURE — U0003 INFECTIOUS AGENT DETECTION BY NUCLEIC ACID (DNA OR RNA); SEVERE ACUTE RESPIRATORY SYNDROME CORONAVIRUS 2 (SARS-COV-2) (CORONAVIRUS DISEASE [COVID-19]), AMPLIFIED PROBE TECHNIQUE, MAKING USE OF HIGH THROUGHPUT TECHNOLOGIES AS DESCRIBED BY CMS-2020-01-R: HCPCS | Performed by: FAMILY MEDICINE

## 2021-11-15 PROCEDURE — U0005 INFEC AGEN DETEC AMPLI PROBE: HCPCS | Performed by: FAMILY MEDICINE

## 2021-12-18 ENCOUNTER — OFFICE VISIT (OUTPATIENT)
Dept: URGENT CARE | Facility: CLINIC | Age: 36
End: 2021-12-18
Payer: COMMERCIAL

## 2021-12-18 VITALS
BODY MASS INDEX: 26.24 KG/M2 | RESPIRATION RATE: 16 BRPM | TEMPERATURE: 97.9 F | WEIGHT: 125 LBS | HEIGHT: 58 IN | OXYGEN SATURATION: 100 % | HEART RATE: 98 BPM

## 2021-12-18 DIAGNOSIS — B96.89 ACUTE BACTERIAL SINUSITIS: Primary | ICD-10-CM

## 2021-12-18 DIAGNOSIS — J01.90 ACUTE BACTERIAL SINUSITIS: Primary | ICD-10-CM

## 2021-12-18 PROCEDURE — G0382 LEV 3 HOSP TYPE B ED VISIT: HCPCS | Performed by: PHYSICIAN ASSISTANT

## 2021-12-18 RX ORDER — AMOXICILLIN AND CLAVULANATE POTASSIUM 875; 125 MG/1; MG/1
1 TABLET, FILM COATED ORAL EVERY 12 HOURS SCHEDULED
Qty: 14 TABLET | Refills: 0 | Status: SHIPPED | OUTPATIENT
Start: 2021-12-18 | End: 2021-12-25

## 2022-10-09 PROBLEM — B34.9 ACUTE VIRAL SYNDROME: Status: RESOLVED | Noted: 2021-11-11 | Resolved: 2022-10-09

## 2022-10-10 ENCOUNTER — ANNUAL EXAM (OUTPATIENT)
Dept: OBGYN CLINIC | Facility: CLINIC | Age: 37
End: 2022-10-10
Payer: COMMERCIAL

## 2022-10-10 VITALS
HEIGHT: 58 IN | DIASTOLIC BLOOD PRESSURE: 62 MMHG | BODY MASS INDEX: 26.83 KG/M2 | WEIGHT: 127.8 LBS | SYSTOLIC BLOOD PRESSURE: 100 MMHG

## 2022-10-10 DIAGNOSIS — Z01.419 ENCNTR FOR GYN EXAM (GENERAL) (ROUTINE) W/O ABN FINDINGS: Primary | ICD-10-CM

## 2022-10-10 PROCEDURE — 99395 PREV VISIT EST AGE 18-39: CPT | Performed by: OBSTETRICS & GYNECOLOGY

## 2022-10-10 NOTE — PROGRESS NOTES
Eloise Garcia  1985      CC:  Yearly exam    S:  44 y o  female here for yearly exam  Her cycles are monthly, not heavy or crampy  Does have some irritation around her menses related to pad use  She denies vaginal discharge, itching, pelvic pain  She has no urinary concerns, does  have incontinence  No bowel concerns  No breast concerns  Sexual activity: She is sexually active without pain, bleeding or dryness  She is  and monogamous  She is not interested in STD screening today  Contraception: She uses vasectomy for contraception  Last Pap: 1/18/21 - NILM, Neg HPV  She has completed gardasil  We reviewed ASCCP guidelines for Pap testing today  Family hx of breast cancer: no  Family hx of ovarian cancer: no  Family hx of colon cancer: no      Current Outpatient Medications:   •  Ascorbic Acid (vitamin C) 100 MG tablet, Take 100 mg by mouth daily, Disp: , Rfl:   •  Multiple Vitamins-Minerals (MULTIVITAMIN GUMMIES ADULT PO), Take by mouth, Disp: , Rfl:      Patient Active Problem List   Diagnosis   • Eczema   • Herpes simplex vulvovaginitis   • Wrist pain, chronic, left   • Stress incontinence   • Well adult exam   • Hives   • Encntr for gyn exam (general) (routine) w/o abn findings   • HPV vaccine counseling     Past Medical History:   Diagnosis Date   • 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    • Eczema     Last Assessed: 7/25/2013   • Forceps delivery     2009 son   • Latent tuberculosis     crx 3/10/2011 neg chest; pt on isoniazid 3/11/11 for 9 mths; quanteferon blood test was neg on 8/20/13   • Normal delivery     2011 son   • Stress incontinence    • Varicella     childhood     Family History   Problem Relation Age of Onset   • Insomnia Mother    • Osteoporosis Mother    • Heart disease Maternal Grandmother    • Heart disease Maternal Grandfather    • Hypertension Maternal Uncle       Review of Systems   Respiratory: Negative  Cardiovascular: Negative      Gastrointestinal: Negative for constipation and diarrhea  O:  Blood pressure 100/62, height 4' 10 27" (1 48 m), weight 58 kg (127 lb 12 8 oz), last menstrual period 10/04/2022, not currently breastfeeding  Patient appears well and is not in distress  Breasts are symmetrical without mass, tenderness, nipple discharge, skin changes or adenopathy  Abdomen is soft and nontender without masses  External genitals are normal without lesions or rashes  Urethral meatus and urethra are normal  Bladder is normal to palpation  Vagina is normal without discharge or bleeding  Cervix is normal without discharge or lesion  Uterus is normal, mobile, nontender without palpable mass  Adnexa are normal, nontender, without palpable mass  A:  Yearly exam, JENNYFER  P:   Pap & HPV up to date   Mammo age 35   Discussed kegel exercises for JENNYFER  Can also consider pelvic floor PT  Avoid Always brand pads  RTO one year for yearly exam or sooner as needed

## 2022-10-12 ENCOUNTER — OFFICE VISIT (OUTPATIENT)
Dept: FAMILY MEDICINE CLINIC | Facility: CLINIC | Age: 37
End: 2022-10-12
Payer: COMMERCIAL

## 2022-10-12 VITALS
RESPIRATION RATE: 16 BRPM | WEIGHT: 127.4 LBS | HEIGHT: 58 IN | OXYGEN SATURATION: 98 % | BODY MASS INDEX: 26.74 KG/M2 | HEART RATE: 67 BPM | DIASTOLIC BLOOD PRESSURE: 70 MMHG | SYSTOLIC BLOOD PRESSURE: 110 MMHG | TEMPERATURE: 98 F

## 2022-10-12 DIAGNOSIS — Z00.00 WELL ADULT EXAM: Primary | ICD-10-CM

## 2022-10-12 DIAGNOSIS — Z23 NEED FOR INFLUENZA VACCINATION: ICD-10-CM

## 2022-10-12 DIAGNOSIS — Z11.59 NEED FOR HEPATITIS C SCREENING TEST: ICD-10-CM

## 2022-10-12 PROCEDURE — 90471 IMMUNIZATION ADMIN: CPT

## 2022-10-12 PROCEDURE — 99395 PREV VISIT EST AGE 18-39: CPT | Performed by: FAMILY MEDICINE

## 2022-10-12 PROCEDURE — 90686 IIV4 VACC NO PRSV 0.5 ML IM: CPT

## 2022-10-12 NOTE — PROGRESS NOTES
Name: Delvis Vázquez      : 1985      MRN: [id number]  Encounter Provider: Willam Betts MD  Encounter Date: 10/12/2022   Encounter department: [address] [place] [address] Drive    Chief Complaint   Patient presents with   • Physical Exam     Annual      Health Maintenance   Topic Date Due   • Hepatitis C Screening  Never done   • COVID-19 Vaccine (3 - Booster for Pfizer series) 10/04/2021   • BMI: Followup Plan  10/05/2022   • Annual Physical  10/10/2023   • Depression Screening  10/12/2023   • BMI: Adult  10/12/2023   • Cervical Cancer Screening  2024   • DTaP,Tdap,and Td Vaccines (6 - Td or Tdap) 2031   • HIV Screening  Completed   • Hepatitis B Vaccine  Completed   • IPV Vaccine  Completed   • Influenza Vaccine  Completed   • Pneumococcal Vaccine: Pediatrics (0 to 5 Years) and At-Risk Patients (6 to 59 Years)  Aged Out   • HIB Vaccine  Aged Out   • Hepatitis A Vaccine  Aged Out   • Meningococcal ACWY Vaccine  Aged Out   • HPV Vaccine  Aged Out       Assessment & Plan     1  Well adult exam  Assessment & Plan:  Recommended to follow a well balanced diet, regular exercise, regular dentist visits  Work on weight reduction  Patient is up to date with pelvic exam and Pap smear as per gynecology  Fluzone administered today  2  Need for hepatitis C screening test  -     Hepatitis C Antibody ([facility], [facility]); Future    3  Need for influenza vaccination  -     influenza vaccine, quadrivalent, 0 5 mL, preservative-free, for adult and pediatric patients 6 mos+ (AFLURIA, FLUARIX, FLULAVAL, FLUZONE)    Schedule annual physical exam in 1 year  Call office with any acute problems  BMI Counseling: Body mass index is 26 44 kg/m²   The BMI is above normal  Nutrition recommendations include decreasing portion sizes, encouraging healthy choices of fruits and vegetables, decreasing fast food intake, consuming healthier snacks, limiting drinks that contain sugar, moderation in carbohydrate intake, increasing intake of lean protein, reducing intake of saturated and trans fat and reducing intake of cholesterol  Exercise recommendations include exercising 3-5 times per week  No pharmacotherapy was ordered  Rationale for BMI follow-up plan is due to patient being overweight or obese  Depression Screening and Follow-up Plan: Patient was screened for depression during today's encounter  They screened negative with a PHQ-2 score of 0  Subjective      HPI     Patient presents for annual physical exam       Works full-time  Remains active, plans to increase regular exercise, work on dietary changes, lose weight  Patient has a busy schedule, has 3 children  Denies chest pain, shortness of breath dizziness  Patient had blood test at work this spring for wellness program       Hemoglobin A1C 5 7  Total cholesterol 202, HDL 62, creatinine 0 68,  LFTs- within range  Patient had pelvic exam done by gynecologist on October 10, 2022  She had negative Pap smear in January 2021  Denies family history of breast cancer, colon cancer  Denies tobacco use  Review of Systems   Constitutional: Negative for activity change, appetite change, chills, fatigue and fever  HENT: Negative for congestion, dental problem, hearing loss, sore throat and trouble swallowing  Eyes: Negative  Respiratory: Negative for cough, chest tightness, shortness of breath and wheezing  Cardiovascular: Negative for chest pain, palpitations and leg swelling  Gastrointestinal: Negative for abdominal pain, blood in stool, constipation, diarrhea, nausea and vomiting  Genitourinary: Negative for difficulty urinating, dysuria, frequency, hematuria and menstrual problem  Musculoskeletal: Negative for arthralgias, back pain, gait problem, joint swelling and myalgias  Skin: Negative for rash  Neurological: Negative for dizziness, syncope and headaches  Hematological: Negative  Psychiatric/Behavioral: Positive for sleep disturbance  Negative for dysphoric mood  The patient is not nervous/anxious  Current Outpatient Medications on File Prior to Visit   Medication Sig   • Ascorbic Acid (vitamin C) 100 MG tablet Take 100 mg by mouth daily   • Multiple Vitamins-Minerals (MULTIVITAMIN GUMMIES ADULT PO) Take by mouth       Objective     /70 (BP Location: Left arm, Patient Position: Sitting, Cuff Size: Standard)   Pulse 67   Temp 98 °F (36 7 °C) (Tympanic)   Resp 16   Ht 4' 10 2" (1 478 m)   Wt 57 8 kg (127 lb 6 4 oz)   LMP 10/04/2022   SpO2 98%   BMI 26 44 kg/m²     Physical Exam  Vitals and nursing note reviewed  Constitutional:       Appearance: Normal appearance  HENT:      Head: Normocephalic and atraumatic  Eyes:      Conjunctiva/sclera: Conjunctivae normal       Pupils: Pupils are equal, round, and reactive to light  Cardiovascular:      Rate and Rhythm: Normal rate and regular rhythm  Heart sounds: Normal heart sounds  No murmur heard  Pulmonary:      Effort: Pulmonary effort is normal       Breath sounds: Normal breath sounds  Abdominal:      General: Bowel sounds are normal  There is no distension  Palpations: Abdomen is soft  Tenderness: There is no abdominal tenderness  Musculoskeletal:         General: No swelling, tenderness or deformity  Normal range of motion  Cervical back: Normal range of motion and neck supple  No tenderness  Right lower leg: No edema  Left lower leg: No edema  Lymphadenopathy:      Cervical: No cervical adenopathy  Skin:     General: Skin is warm and dry  Findings: No rash  Neurological:      General: No focal deficit present  Mental Status: She is alert  Motor: No weakness        Coordination: Coordination normal    Psychiatric:         Mood and Affect: Mood normal        Britni Coronado MD

## 2022-10-12 NOTE — ASSESSMENT & PLAN NOTE
Recommended to follow a well balanced diet, regular exercise, regular dentist visits  Work on weight reduction  Patient is up to date with pelvic exam and Pap smear as per gynecology  Fluzone administered today

## 2022-12-19 ENCOUNTER — OFFICE VISIT (OUTPATIENT)
Dept: URGENT CARE | Age: 37
End: 2022-12-19

## 2022-12-19 VITALS — RESPIRATION RATE: 16 BRPM | OXYGEN SATURATION: 99 % | TEMPERATURE: 97.8 F | HEART RATE: 109 BPM

## 2022-12-19 DIAGNOSIS — J01.90 ACUTE BACTERIAL SINUSITIS: Primary | ICD-10-CM

## 2022-12-19 DIAGNOSIS — B96.89 ACUTE BACTERIAL SINUSITIS: Primary | ICD-10-CM

## 2022-12-19 RX ORDER — AMOXICILLIN AND CLAVULANATE POTASSIUM 875; 125 MG/1; MG/1
1 TABLET, FILM COATED ORAL EVERY 12 HOURS SCHEDULED
Qty: 14 TABLET | Refills: 0 | Status: SHIPPED | OUTPATIENT
Start: 2022-12-19 | End: 2022-12-26

## 2022-12-20 NOTE — PROGRESS NOTES
[email or web address]        NAME: Devon Anne is a 44 y o  female  : 1985    MRN: [id number]  DATE: 2022  TIME: 7:58 PM    Assessment and Plan   Acute bacterial sinusitis [J01 90, B96 89]  1  Acute bacterial sinusitis  amoxicillin-clavulanate (AUGMENTIN) 875-125 mg per tablet        Discussed that symptoms lasted over week, will need antibiotics  Patient Instructions       Follow up with PCP in 3-5 days  Proceed to  ER if symptoms worsen  Chief Complaint     Chief Complaint   Patient presents with   • Sinusitis     Sinus pressure, right ear clogged, for 1 week, PND         History of Present Illness       HPI  Patient presents today complaining of increased sinus pain and pressure ongoing for the past few days  Her symptoms have been going on for over week  Patient is also having lot of postnasal drip and her right ear is clogged  She is also having some congestion  Patient does not have any fevers or chills    Review of Systems   Review of Systems  Per HPI    Current Medications       Current Outpatient Medications:   •  amoxicillin-clavulanate (AUGMENTIN) 875-125 mg per tablet, Take 1 tablet by mouth every 12 (twelve) hours for 7 days, Disp: 14 tablet, Rfl: 0  •  Ascorbic Acid (vitamin C) 100 MG tablet, Take 100 mg by mouth daily, Disp: , Rfl:   •  Multiple Vitamins-Minerals (MULTIVITAMIN GUMMIES ADULT PO), Take by mouth, Disp: , Rfl:     Current Allergies     Allergies as of 2022   • (No Known Allergies)            The following portions of the patient's history were reviewed and updated as appropriate: allergies, current medications, past family history, past medical history, past social history, past surgical history and problem list      Past Medical History:   Diagnosis Date   • 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    • Eczema     Last Assessed: 2013   • Forceps delivery     2009 son   • Latent tuberculosis     crx 3/10/2011 neg chest; pt on isoniazid 3/11/11 for 9 mths; quanteferon blood test was neg on 8/20/13   • Normal delivery     2011 son   • Stress incontinence    • Varicella     childhood       Past Surgical History:   Procedure Laterality Date   • WISDOM TOOTH EXTRACTION         Family History   Problem Relation Age of Onset   • Insomnia Mother    • Osteoporosis Mother    • Heart disease Maternal Grandmother    • Heart disease Maternal Grandfather    • Hypertension Maternal Uncle          Medications have been verified  Objective   Pulse (!) 109   Temp 97 8 °F (36 6 °C)   Resp 16   SpO2 99%   No LMP recorded  Physical Exam     Physical Exam  Constitutional:       General: She is not in acute distress  Appearance: Normal appearance  HENT:      Head: Normocephalic  Nose: Congestion present  No rhinorrhea  Mouth/Throat:      Mouth: Mucous membranes are moist       Pharynx: No oropharyngeal exudate or posterior oropharyngeal erythema  Comments: Tender maxillary sinus  Eyes:      General:         Right eye: No discharge  Left eye: No discharge  Conjunctiva/sclera: Conjunctivae normal    Cardiovascular:      Rate and Rhythm: Normal rate and regular rhythm  Pulses: Normal pulses  Pulmonary:      Effort: Pulmonary effort is normal  No respiratory distress  Abdominal:      General: Abdomen is flat  There is no distension  Palpations: Abdomen is soft  Tenderness: There is no abdominal tenderness  Musculoskeletal:      Cervical back: Neck supple  Skin:     General: Skin is warm  Capillary Refill: Capillary refill takes less than 2 seconds  Neurological:      Mental Status: She is alert and oriented to person, place, and time

## 2023-07-26 ENCOUNTER — VBI (OUTPATIENT)
Dept: ADMINISTRATIVE | Facility: OTHER | Age: 38
End: 2023-07-26

## 2023-10-17 ENCOUNTER — OFFICE VISIT (OUTPATIENT)
Dept: FAMILY MEDICINE CLINIC | Facility: CLINIC | Age: 38
End: 2023-10-17
Payer: COMMERCIAL

## 2023-10-17 VITALS
OXYGEN SATURATION: 97 % | DIASTOLIC BLOOD PRESSURE: 64 MMHG | SYSTOLIC BLOOD PRESSURE: 92 MMHG | HEIGHT: 58 IN | HEART RATE: 72 BPM | BODY MASS INDEX: 26.99 KG/M2 | WEIGHT: 128.6 LBS | TEMPERATURE: 97.2 F | RESPIRATION RATE: 16 BRPM

## 2023-10-17 DIAGNOSIS — Z11.59 NEED FOR HEPATITIS C SCREENING TEST: ICD-10-CM

## 2023-10-17 DIAGNOSIS — N39.3 STRESS INCONTINENCE: ICD-10-CM

## 2023-10-17 DIAGNOSIS — Z00.00 WELL ADULT EXAM: Primary | ICD-10-CM

## 2023-10-17 DIAGNOSIS — Z13.6 SCREENING FOR CARDIOVASCULAR CONDITION: ICD-10-CM

## 2023-10-17 PROCEDURE — 99395 PREV VISIT EST AGE 18-39: CPT | Performed by: FAMILY MEDICINE

## 2023-10-17 NOTE — PROGRESS NOTES
[address] [facility],[address] [facility]    NAME: Janes Brizuela  AGE: 43 y.o. SEX: female  : 1985     DATE: 10/17/2023     Assessment and Plan:     Problem List Items Addressed This Visit          Other    Stress incontinence     Recommended to start Kegel exercises. Consider referral referral to urogynecology, pelvic floor therapy if stmptoms persist or worsen. Well adult exam - Primary    Relevant Orders    CBC and differential    Comprehensive metabolic panel     Other Visit Diagnoses       Need for hepatitis C screening test        Relevant Orders    Hepatitis C antibody    Screening for cardiovascular condition        Relevant Orders    Lipid panel            Immunizations and preventive care screenings were discussed with patient today. Appropriate education was printed on patient's after visit summary. Counseling:  Alcohol/drug use: discussed moderation in alcohol intake, the recommendations for healthy alcohol use, and avoidance of illicit drug use. Dental Health: discussed importance of regular tooth brushing, flossing, and dental visits. Injury prevention: discussed safety/seat belts, safety helmets, smoke detectors, carbon dioxide detectors, and smoking near bedding or upholstery. Sexual health: discussed sexually transmitted diseases, partner selection, use of condoms, avoidance of unintended pregnancy, and contraceptive alternatives. Exercise: the importance of regular exercise/physical activity was discussed. Recommend exercise 3-5 times per week for at least 30 minutes. BMI Counseling: Body mass index is 26.88 kg/m².  The BMI is above normal. Nutrition recommendations include decreasing portion sizes, encouraging healthy choices of fruits and vegetables, decreasing fast food intake, consuming healthier snacks, limiting drinks that contain sugar, moderation in carbohydrate intake, increasing intake of lean protein, reducing intake of saturated and trans fat and reducing intake of cholesterol. Exercise recommendations include moderate physical activity 150 minutes/week and exercising 3-5 times per week. Rationale for BMI follow-up plan is due to patient being overweight or obese. Depression Screening and Follow-up Plan: Patient was screened for depression during today's encounter. They screened negative with a PHQ-2 score of 0. Recommended to schedule annual visit with gynecology for pelvic exam.  Start screening mammogram at age 35. Return in about 1 year (around 10/17/2024) for Annual physical.     Chief Complaint:     Chief Complaint   Patient presents with    Physical Exam     Annual       History of Present Illness:     Adult Annual Physical     Patient here for a comprehensive physical exam.     Patient works full-time. She has 3 children, busy with school activities. She does not exercise on a regular basis. C/o leakage of urine with coughing and sneezing. Reports no constipation. Denies family history of colon cancer or breath breast cancer. Patient was seen by her gynecologist for pelvic exam in October 2022. Pap smear done in January 2021 was normal.      Received flu shot at [facility] pharmacy on 10/11/23. Diet and Physical Activity  Diet/Nutrition: limited junk food and adequate fiber intake. Exercise: no formal exercise. Depression Screening  PHQ-2/9 Depression Screening    Little interest or pleasure in doing things: 0 - not at all  Feeling down, depressed, or hopeless: 0 - not at all  PHQ-2 Score: 0  PHQ-2 Interpretation: Negative depression screen       General Health  Sleep: sleeps well. Hearing: normal - bilateral.  Vision: no vision problems. Dental: regular dental visits.        /GYN Health  Last menstrual period: 10/11/23  Contraceptive method: vasectomy  History of STDs?: no.    Advanced Care Planning  Do you have an advanced directive? no  Do you have a durable medical power of ? no     Review of Systems:     Review of Systems   Constitutional:  Negative for activity change, appetite change, chills, fatigue and fever. HENT:  Negative for congestion, ear pain, hearing loss, nosebleeds, sore throat and trouble swallowing. Eyes:  Negative for pain, discharge, redness, itching and visual disturbance. Wears contact lenses   Respiratory:  Negative for cough, chest tightness and shortness of breath. Cardiovascular:  Negative for chest pain, palpitations and leg swelling. Gastrointestinal:  Negative for abdominal pain, blood in stool, constipation, diarrhea, nausea and vomiting. Genitourinary:  Negative for difficulty urinating, dysuria, hematuria, pelvic pain, vaginal bleeding and vaginal discharge. Stress urinary incontinence   Musculoskeletal:  Negative for arthralgias, back pain, gait problem and joint swelling. Skin:  Negative for rash. Neurological:  Negative for dizziness, syncope and headaches. Hematological: Negative. Psychiatric/Behavioral:  Negative for dysphoric mood and sleep disturbance. The patient is not nervous/anxious.        Past Medical History:     Past Medical History:   Diagnosis Date    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     Eczema     Last Assessed: 7/25/2013    Forceps delivery     2009 son    Latent tuberculosis     crx 3/10/2011 neg chest; pt on isoniazid 3/11/11 for 9 mths; quanteferon blood test was neg on 8/20/13    Normal delivery     2011 son    Stress incontinence     Varicella     childhood      Past Surgical History:     Past Surgical History:   Procedure Laterality Date    WISDOM TOOTH EXTRACTION        Social History:     Social History     Socioeconomic History    Marital status: /Civil Union     Spouse name: None    Number of children: None    Years of education: None    Highest education level: None   Occupational History    None   Tobacco Use    Smoking status: Never    Smokeless tobacco: Never Vaping Use    Vaping Use: Never used   Substance and Sexual Activity    Alcohol use: No    Drug use: No    Sexual activity: Yes     Partners: Male     Birth control/protection: Male Sterilization   Other Topics Concern    None   Social History Narrative    Denied: history of coffee    Lack of exercise    Uses safety equipment - seatbelts     Social Determinants of Health     Financial Resource Strain: Not on file   Food Insecurity: Not on file   Transportation Needs: Not on file   Physical Activity: Not on file   Stress: Not on file   Social Connections: Not on file   Intimate Partner Violence: Not on file   Housing Stability: Not on file      Family History:     Family History   Problem Relation Age of Onset    Insomnia Mother     Osteoporosis Mother     Heart disease Maternal Grandmother     Heart disease Maternal Grandfather     Hypertension Maternal Uncle       Current Medications:     No current outpatient medications on file. No current facility-administered medications for this visit. Allergies:     No Known Allergies   Physical Exam:     BP 92/64   Pulse 72   Temp (!) 97.2 °F (36.2 °C) (Temporal)   Resp 16   Ht 4' 10" (1.473 m)   Wt 58.3 kg (128 lb 9.6 oz)   SpO2 97%   BMI 26.88 kg/m²     Physical Exam  Vitals and nursing note reviewed. Constitutional:       Appearance: Normal appearance. HENT:      Head: Normocephalic and atraumatic. Right Ear: Tympanic membrane normal.      Left Ear: Tympanic membrane normal.      Nose: No congestion. Eyes:      Conjunctiva/sclera: Conjunctivae normal.      Pupils: Pupils are equal, round, and reactive to light. Cardiovascular:      Rate and Rhythm: Normal rate and regular rhythm. Heart sounds: No murmur heard. Pulmonary:      Effort: Pulmonary effort is normal.      Breath sounds: Normal breath sounds. Abdominal:      General: Bowel sounds are normal. There is no distension. Palpations: Abdomen is soft. Tenderness:  There is no abdominal tenderness. Musculoskeletal:         General: No swelling or tenderness. Normal range of motion. Cervical back: Normal range of motion and neck supple. No tenderness. Right lower leg: No edema. Left lower leg: No edema. Lymphadenopathy:      Cervical: No cervical adenopathy. Skin:     General: Skin is warm and dry. Findings: No rash. Comments: Seborrheic keratosis lesion on left cheek   Neurological:      General: No focal deficit present. Mental Status: She is alert. Motor: No weakness.       Gait: Gait normal.   Psychiatric:         Mood and Affect: Mood normal.          Audrey Schumacher MD   [address] [place] [address]

## 2023-10-17 NOTE — ASSESSMENT & PLAN NOTE
Recommended to start Kegel exercises. Consider referral referral to urogynecology, pelvic floor therapy if stmptoms persist or worsen.

## 2023-11-07 ENCOUNTER — ANNUAL EXAM (OUTPATIENT)
Age: 38
End: 2023-11-07
Payer: COMMERCIAL

## 2023-11-07 VITALS
SYSTOLIC BLOOD PRESSURE: 112 MMHG | DIASTOLIC BLOOD PRESSURE: 62 MMHG | WEIGHT: 129.8 LBS | BODY MASS INDEX: 27.25 KG/M2 | HEIGHT: 58 IN

## 2023-11-07 DIAGNOSIS — Z01.419 ENCOUNTER FOR ANNUAL ROUTINE GYNECOLOGICAL EXAMINATION: Primary | ICD-10-CM

## 2023-11-07 PROCEDURE — 99395 PREV VISIT EST AGE 18-39: CPT | Performed by: OBSTETRICS & GYNECOLOGY

## 2023-11-07 RX ORDER — DIPHENOXYLATE HYDROCHLORIDE AND ATROPINE SULFATE 2.5; .025 MG/1; MG/1
1 TABLET ORAL DAILY
COMMUNITY

## 2023-11-07 NOTE — PROGRESS NOTES
Mamta Farahpe  1985      CC:  Yearly exam    S:  43 y.o. female here for yearly exam.  Haig Layman her cycles may be irregular - but admits she is not tracking them. She can bleed for 4-5days, mild cramping. She will start to track them. She denies vaginal discharge, itching, pelvic pain. She has no urinary concerns, does have incontinence. No bowel concerns other than hemorrhoid. No breast concerns. Sexual activity: She is sexually active without pain, bleeding or dryness. She is  and monogamous. She is not interested in STD screening today. Contraception: She uses vasectomy for contraception. Last Pap: 1/18/21 - NILM, Neg HPV    We reviewed ASCCP guidelines for Pap testing today. Family hx of breast cancer: no  Family hx of ovarian cancer: no  Family hx of colon cancer: no      Current Outpatient Medications:     multivitamin (THERAGRAN) TABS, Take 1 tablet by mouth daily, Disp: , Rfl:   Patient Active Problem List   Diagnosis    Eczema    Herpes simplex vulvovaginitis    Wrist pain, chronic, left    Stress incontinence    Well adult exam    Hives    Encntr for gyn exam (general) (routine) w/o abn findings    HPV vaccine counseling     Past Medical History:   Diagnosis Date    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     Eczema     Last Assessed: 7/25/2013    Forceps delivery     2009 son    Latent tuberculosis     crx 3/10/2011 neg chest; pt on isoniazid 3/11/11 for 9 mths; quanteferon blood test was neg on 8/20/13    Normal delivery     2011 son    Stress incontinence     Varicella     childhood     Family History   Problem Relation Age of Onset    Insomnia Mother     Osteoporosis Mother     Heart disease Maternal Grandmother     Heart disease Maternal Grandfather     Hypertension Maternal Uncle       Review of Systems   Respiratory: Negative. Cardiovascular: Negative. Gastrointestinal: Negative for constipation and diarrhea.      O:  Blood pressure 112/62, height 4' 9.5" (1.461 m), weight 58.9 kg (129 lb 12.8 oz), last menstrual period 10/11/2023, not currently breastfeeding. Patient appears well and is not in distress  Breasts are symmetrical without mass, tenderness, nipple discharge, skin changes or adenopathy. Abdomen is soft and nontender without masses. External genitals are normal without lesions or rashes. Urethral meatus and urethra are normal  Bladder is normal to palpation  Vagina is normal without discharge or bleeding. Cervix is normal without discharge or lesion. Uterus is normal, mobile, nontender without palpable mass. Adnexa are normal, nontender, without palpable mass. A:  Yearly exam.     P:   Pap & HPV up to date   Mammo age 35   RTO one year for yearly exam or sooner as needed.

## 2024-02-21 PROBLEM — Z00.00 WELL ADULT EXAM: Status: RESOLVED | Noted: 2019-09-03 | Resolved: 2024-02-21

## 2024-02-21 PROBLEM — Z01.419 ENCNTR FOR GYN EXAM (GENERAL) (ROUTINE) W/O ABN FINDINGS: Status: RESOLVED | Noted: 2021-01-18 | Resolved: 2024-02-21

## 2024-10-22 ENCOUNTER — OFFICE VISIT (OUTPATIENT)
Dept: FAMILY MEDICINE CLINIC | Facility: CLINIC | Age: 39
End: 2024-10-22
Payer: COMMERCIAL

## 2024-10-22 VITALS
SYSTOLIC BLOOD PRESSURE: 104 MMHG | OXYGEN SATURATION: 100 % | WEIGHT: 123.6 LBS | TEMPERATURE: 98 F | BODY MASS INDEX: 25.94 KG/M2 | DIASTOLIC BLOOD PRESSURE: 70 MMHG | RESPIRATION RATE: 18 BRPM | HEIGHT: 58 IN | HEART RATE: 78 BPM

## 2024-10-22 DIAGNOSIS — Z13.6 SCREENING FOR CARDIOVASCULAR CONDITION: ICD-10-CM

## 2024-10-22 DIAGNOSIS — Z00.00 WELL ADULT EXAM: Primary | ICD-10-CM

## 2024-10-22 DIAGNOSIS — N39.3 STRESS INCONTINENCE: ICD-10-CM

## 2024-10-22 DIAGNOSIS — Z12.31 ENCOUNTER FOR SCREENING MAMMOGRAM FOR MALIGNANT NEOPLASM OF BREAST: ICD-10-CM

## 2024-10-22 DIAGNOSIS — L82.1 SEBORRHEIC KERATOSES: ICD-10-CM

## 2024-10-22 PROCEDURE — 99396 PREV VISIT EST AGE 40-64: CPT | Performed by: FAMILY MEDICINE

## 2024-10-22 NOTE — ASSESSMENT & PLAN NOTE
Recommended  to follow a low-cholesterol, low-carb diet.  Discussed dietary modifications with patient.  Encouraged to start regular exercise.      Follow-up with gynecology for pelvic exam and Pap smear next month.  Start colorectal cancer screening at age 44.    Recheck lipid panel in 6 months.  Declined Covid vaccination.  Received Flu shot at work last week.      Orders:    Comprehensive metabolic panel; Future

## 2024-10-22 NOTE — PROGRESS NOTES
Adult Annual Physical  Name: Laura Whitten      : 1985      MRN: [id number]  Encounter Provider: Lynn Hilton MD  Encounter Date: 10/22/2024   Encounter department: [facility]    Chief Complaint   Patient presents with    Physical Exam     Annual check up      Health Maintenance   Topic Date Due    Hepatitis C Screening  Never done    COVID-19 Vaccine ( season) 2024    Annual Physical  10/17/2024    Breast Cancer Screening: Mammogram  10/06/2024    Depression Screening  10/22/2025    Cervical Cancer Screening  2026    DTaP,Tdap,and Td Vaccines (6 - Td or Tdap) 2031    Zoster Vaccine (1 of 2) 10/06/2034    RSV Vaccine Age 60+ Years (1 - 1-dose 60+ series) 10/06/2044    HIV Screening  Completed    IPV Vaccine  Completed    Influenza Vaccine  Completed    HPV Vaccine  Completed    RSV Vaccine age 0-20 Months  Aged Out    Pneumococcal Vaccine: Pediatrics (0 to 5 Years) and At-Risk Patients (6 to 64 Years)  Aged Out    HIB Vaccine  Aged Out    Hepatitis A Vaccine  Aged Out    Meningococcal ACWY Vaccine  Aged Out       Assessment & Plan  Well adult exam  Recommended  to follow a low-cholesterol, low-carb diet.  Discussed dietary modifications with patient.  Encouraged to start regular exercise.      Follow-up with gynecology for pelvic exam and Pap smear next month.  Start colorectal cancer screening at age 44.    Recheck lipid panel in 6 months.  Declined Covid vaccination.  Received Flu shot at work last week.      Orders:    Comprehensive metabolic panel; Future    Encounter for screening mammogram for malignant neoplasm of breast    Orders:    Mammo screening bilateral w 3d and cad; Future    Screening for cardiovascular condition    Orders:    Lipid panel; Future    Seborrheic keratoses  Patient has large seborrheic keratosis lesion on left cheek, would like to see a dermatologist for removal of the lesion.    Orders:    Ambulatory Referral to  Dermatology; Future    Stress incontinence  Recommended to continue Kegel exercises.    Call office if symptoms persist or worsen, will refer to urogynecology, consider pelvic floor therapy.         Immunizations and preventive care screenings were discussed with patient today. Appropriate education was printed on patient's after visit summary.    Counseling:  Alcohol/drug use: discussed moderation in alcohol intake, the recommendations for healthy alcohol use, and avoidance of illicit drug use.  Dental Health: discussed importance of regular tooth brushing, flossing, and dental visits.  Injury prevention: discussed safety/seat belts, safety helmets, smoke detectors, carbon monoxide detectors, and smoking near bedding or upholstery.  Sexual health: discussed sexually transmitted diseases, partner selection, use of condoms, avoidance of unintended pregnancy, and contraceptive alternatives.  Exercise: the importance of regular exercise/physical activity was discussed. Recommend exercise 3-5 times per week for at least 30 minutes.       Depression Screening and Follow-up Plan: Patient was screened for depression during today's encounter. They screened negative with a PHQ-2 score of 0.      Schedule annual physical exam in 1 year.    History of Present Illness     Patient presents for annual physical exam.    She had blood test done for a wellness program at work in May 2024 which showed total cholesterol at 217, Hb A1C 5.9, creatinine 0.68.    Denies chest pain, shortness of breath, dizziness.      Pelvic exam done by gynecology in November 2023.    Pap smear was normal in January 2021.      C/o mild urinary stress incontinence, does Kegel exercises at home.    Patient received flu shot at work last week.    Denies family history of diabetes, colon cancer, breast cancer.    Adult Annual Physical:  Patient presents for annual physical.     Diet and Physical Activity:  - Diet/Nutrition: limited junk food, adequate fiber  "intake, adequate whole grain intake and consuming 3-5 servings of fruits/vegetables daily.  - Exercise: no formal exercise.    Depression Screening:  - PHQ-2 Score: 0    General Health:  - Sleep: sleeps well.  - Hearing: normal hearing bilateral ears.  - Vision: no vision problems.  - Dental: regular dental visits.    /GYN Health:  - Follows with GYN: yes.   - Menopause: premenopausal.   - Last menstrual cycle: 9/29/2024.   - Contraception: male partner had vasectomy.      Advanced Care Planning:  - Has an advanced directive?: no    - Has a durable medical POA?: no      Review of Systems   Constitutional:  Negative for activity change, appetite change, chills, fatigue and fever.   HENT:  Negative for congestion, ear pain, hearing loss, sore throat and trouble swallowing.    Eyes: Negative.    Respiratory:  Negative for cough and shortness of breath.    Cardiovascular:  Negative for chest pain, palpitations and leg swelling.   Gastrointestinal:  Negative for abdominal pain, blood in stool, constipation, diarrhea, nausea and vomiting.   Genitourinary:  Negative for difficulty urinating, dysuria, hematuria and menstrual problem.        Mild urinary stress incontinence   Musculoskeletal:  Negative for arthralgias, back pain, gait problem and joint swelling.   Skin:  Negative for rash.   Neurological:  Negative for dizziness, syncope and headaches.   Hematological: Negative.    Psychiatric/Behavioral:  Negative for dysphoric mood and sleep disturbance. The patient is not nervous/anxious.          Objective     /70   Pulse 78   Temp 98 °F (36.7 °C) (Tympanic)   Resp 18   Ht 4' 10\" (1.473 m)   Wt 56.1 kg (123 lb 9.6 oz)   SpO2 100%   BMI 25.83 kg/m²     Physical Exam  Vitals and nursing note reviewed.   Constitutional:       Appearance: Normal appearance.   HENT:      Head: Normocephalic and atraumatic.   Eyes:      Conjunctiva/sclera: Conjunctivae normal.      Pupils: Pupils are equal, round, and reactive " to light.   Neck:      Vascular: No carotid bruit.   Cardiovascular:      Rate and Rhythm: Normal rate and regular rhythm.      Heart sounds: No murmur heard.  Pulmonary:      Effort: Pulmonary effort is normal.      Breath sounds: Normal breath sounds.   Abdominal:      General: Bowel sounds are normal. There is no distension.      Palpations: Abdomen is soft.      Tenderness: There is no abdominal tenderness.   Musculoskeletal:         General: No swelling. Normal range of motion.      Cervical back: Normal range of motion and neck supple.      Right lower leg: No edema.      Left lower leg: No edema.   Skin:     General: Skin is warm and dry.      Findings: No rash.      Comments: Large seborrheic keratosis lesion on left cheek   Neurological:      General: No focal deficit present.      Mental Status: She is alert.      Motor: No weakness.      Gait: Gait normal.   Psychiatric:         Mood and Affect: Mood normal.

## 2024-10-22 NOTE — ASSESSMENT & PLAN NOTE
Recommended to continue Kegel exercises.    Call office if symptoms persist or worsen, will refer to urogynecology, consider pelvic floor therapy.

## 2024-10-22 NOTE — ASSESSMENT & PLAN NOTE
Patient has large seborrheic keratosis lesion on left cheek, would like to see a dermatologist for removal of the lesion.    Orders:    Ambulatory Referral to Dermatology; Future

## 2024-11-08 ENCOUNTER — ANNUAL EXAM (OUTPATIENT)
Age: 39
End: 2024-11-08
Payer: COMMERCIAL

## 2024-11-08 VITALS
HEIGHT: 58 IN | WEIGHT: 125.2 LBS | BODY MASS INDEX: 26.28 KG/M2 | SYSTOLIC BLOOD PRESSURE: 110 MMHG | DIASTOLIC BLOOD PRESSURE: 72 MMHG

## 2024-11-08 DIAGNOSIS — Z01.419 ROUTINE GYNECOLOGICAL EXAMINATION: Primary | ICD-10-CM

## 2024-11-08 PROCEDURE — 99396 PREV VISIT EST AGE 40-64: CPT | Performed by: OBSTETRICS & GYNECOLOGY

## 2024-11-08 NOTE — PROGRESS NOTES
"Laura Whitten  1985      CC:  Yearly exam    S:  39 y.o. female here for yearly exam. Her cycles are regular, not heavy or crampy.     Period Cycle (Days): 28  Period Duration (Days): 5  Period Pattern: Regular  Menstrual Flow: Heavy, Light (2 days heavy)    She denies vaginal discharge, itching, pelvic pain.   She has no urinary concerns, does have stress incontinence (declines PT).  No bowel concerns.  No breast concerns.     Sexual activity: She is sexually active without pain, bleeding or dryness.   She is  and monogamous.   She is not interested in STD screening today.     Contraception: She uses vasectomy  for contraception.     Last Pap: 1/18/21 - NILM, Neg HPV  Last Mammo:  Has order    We reviewed ASC guidelines for Pap testing today.     Family hx of breast cancer: no  Family hx of ovarian cancer: no  Family hx of colon cancer: no      Current Outpatient Medications:     multivitamin (THERAGRAN) TABS, Take 1 tablet by mouth daily, Disp: , Rfl:   Patient Active Problem List   Diagnosis    Eczema    Herpes simplex vulvovaginitis    Wrist pain, chronic, left    Stress incontinence    Well adult exam    Hives    HPV vaccine counseling    Seborrheic keratoses     Past Medical History:   Diagnosis Date    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     Eczema     Last Assessed: 7/25/2013    Forceps delivery     2009 son    Latent tuberculosis     crx 3/10/2011 neg chest; pt on isoniazid 3/11/11 for 9 mths; quanteferon blood test was neg on 8/20/13    Normal delivery     2011 son    Stress incontinence     Varicella     childhood     Family History   Problem Relation Age of Onset    Insomnia Mother     Osteoporosis Mother     Heart disease Maternal Grandmother     Heart disease Maternal Grandfather     Hypertension Maternal Uncle           Review of Systems   Respiratory: Negative.    Cardiovascular: Negative.    Gastrointestinal: Negative for constipation and diarrhea.     O:  Blood pressure 110/72, height 4' 10\" " (1.473 m), weight 56.8 kg (125 lb 3.2 oz), last menstrual period 10/26/2024, not currently breastfeeding.    Patient appears well and is not in distress  Breasts are symmetrical without mass, tenderness, nipple discharge, skin changes or adenopathy.   Abdomen is soft and nontender without masses.   External genitals are normal without lesions or rashes.  Urethral meatus and urethra are normal  Bladder is normal to palpation  Vagina is normal without discharge or bleeding.   Cervix is normal without discharge or lesion.   Uterus is normal, mobile, nontender without palpable mass.  Adnexa are normal, nontender, without palpable mass.     A:  Yearly exam.     P:   Pap & HPV discussed, plan 2024   Mammo encouraged   Colon Cancer Screening discussed, age 44   RTO one year for yearly exam or sooner as needed.

## 2024-11-19 PROBLEM — Z00.00 WELL ADULT EXAM: Status: RESOLVED | Noted: 2019-09-03 | Resolved: 2024-11-19

## 2025-01-20 ENCOUNTER — HOSPITAL ENCOUNTER (OUTPATIENT)
Dept: MAMMOGRAPHY | Facility: MEDICAL CENTER | Age: 40
Discharge: HOME | End: 2025-01-20
Payer: COMMERCIAL

## 2025-01-20 VITALS — WEIGHT: 128 LBS | HEIGHT: 58 IN | BODY MASS INDEX: 26.87 KG/M2

## 2025-01-20 DIAGNOSIS — Z12.31 ENCOUNTER FOR SCREENING MAMMOGRAM FOR MALIGNANT NEOPLASM OF BREAST: ICD-10-CM

## 2025-01-20 PROCEDURE — 77067 SCR MAMMO BI INCL CAD: CPT

## 2025-01-20 PROCEDURE — 77063 BREAST TOMOSYNTHESIS BI: CPT

## 2025-01-27 ENCOUNTER — RESULTS FOLLOW-UP (OUTPATIENT)
Dept: FAMILY MEDICINE CLINIC | Facility: CLINIC | Age: 40
End: 2025-01-27

## 2025-06-13 ENCOUNTER — TELEPHONE (OUTPATIENT)
Age: 40
End: 2025-06-13

## 2025-06-13 NOTE — TELEPHONE ENCOUNTER
Recieved call from Patient for CONSULT - Seborrheic keratoses. Scheduled 8/20/25 8:30 am Yefri Yu. Verified insurance, provided [place] addr.     Patient verbalized understanding.

## 2025-08-20 ENCOUNTER — CONSULT (OUTPATIENT)
Age: 40
End: 2025-08-20
Attending: FAMILY MEDICINE

## 2025-08-20 VITALS — HEIGHT: 58 IN | BODY MASS INDEX: 26.87 KG/M2 | WEIGHT: 128 LBS

## 2025-08-20 DIAGNOSIS — L82.1 SEBORRHEIC KERATOSES: ICD-10-CM
